# Patient Record
Sex: MALE | Race: WHITE | NOT HISPANIC OR LATINO | Employment: OTHER | URBAN - METROPOLITAN AREA
[De-identification: names, ages, dates, MRNs, and addresses within clinical notes are randomized per-mention and may not be internally consistent; named-entity substitution may affect disease eponyms.]

---

## 2019-11-14 ENCOUNTER — OFFICE VISIT (OUTPATIENT)
Dept: FAMILY MEDICINE CLINIC | Facility: CLINIC | Age: 64
End: 2019-11-14

## 2019-11-14 VITALS
HEART RATE: 87 BPM | WEIGHT: 215 LBS | OXYGEN SATURATION: 97 % | HEIGHT: 70 IN | BODY MASS INDEX: 30.78 KG/M2 | SYSTOLIC BLOOD PRESSURE: 138 MMHG | DIASTOLIC BLOOD PRESSURE: 80 MMHG

## 2019-11-14 DIAGNOSIS — R10.11 RUQ PAIN: ICD-10-CM

## 2019-11-14 DIAGNOSIS — J84.9 INTERSTITIAL PNEUMONIA (HCC): ICD-10-CM

## 2019-11-14 DIAGNOSIS — R05.9 COUGH: Primary | ICD-10-CM

## 2019-11-14 PROCEDURE — 99203 OFFICE O/P NEW LOW 30 MIN: CPT | Performed by: FAMILY MEDICINE

## 2019-11-14 PROCEDURE — 96372 THER/PROPH/DIAG INJ SC/IM: CPT | Performed by: FAMILY MEDICINE

## 2019-11-14 RX ORDER — ONDANSETRON 4 MG/1
TABLET, ORALLY DISINTEGRATING ORAL
Refills: 0 | COMMUNITY
Start: 2019-11-01 | End: 2020-02-24

## 2019-11-14 RX ORDER — OMEPRAZOLE 20 MG/1
20 CAPSULE, DELAYED RELEASE ORAL DAILY
COMMUNITY

## 2019-11-14 RX ORDER — ALBUTEROL SULFATE 90 UG/1
2 AEROSOL, METERED RESPIRATORY (INHALATION) EVERY 4 HOURS PRN
COMMUNITY
Start: 2019-11-13

## 2019-11-14 RX ORDER — METHYLPREDNISOLONE ACETATE 80 MG/ML
80 INJECTION, SUSPENSION INTRA-ARTICULAR; INTRALESIONAL; INTRAMUSCULAR; SOFT TISSUE ONCE
Status: COMPLETED | OUTPATIENT
Start: 2019-11-14 | End: 2019-11-14

## 2019-11-14 RX ADMIN — METHYLPREDNISOLONE ACETATE 80 MG: 80 INJECTION, SUSPENSION INTRA-ARTICULAR; INTRALESIONAL; INTRAMUSCULAR; SOFT TISSUE at 15:02

## 2019-11-14 NOTE — PROGRESS NOTES
Subjective   Kb Walker is a 64 y.o. male.     Chief Complaint   Patient presents with   • Establish Care     complications due to pneumonia        History of Present Illness     Acute complaints:  Kb Walker is a 64 y.o. male who presents today to establish care. Started October 11, he had been combining sawdust and lots of irritants, developed pneumonia. Initially on Levaquin, no better, then doxycycline for 10 days, no better, then recvd a shot of rocephin, albuterol. A repeat CT scan the next week showed resolution of the PNA but still having symptoms, along with GI symptoms. Rx prednisone which he did not take much due to GI side effects. Now still having breathing difficulty and GI effects despite taking probiotics.    This patient is accompanied by their wife Lety who contributes to the history of their care.    The following portions of the patient's history were reviewed and updated as appropriate: allergies, current medications, past family history, past medical history, past social history, past surgical history and problem list.    Active Ambulatory Problems     Diagnosis Date Noted   • No Active Ambulatory Problems     Resolved Ambulatory Problems     Diagnosis Date Noted   • No Resolved Ambulatory Problems     Past Medical History:   Diagnosis Date   • Arthritis    • Gall stones      History reviewed. No pertinent surgical history.  Family History   Problem Relation Age of Onset   • Arthritis Mother    • Arthritis Father    • Hypertension Father      Social History     Socioeconomic History   • Marital status:      Spouse name: Not on file   • Number of children: Not on file   • Years of education: Not on file   • Highest education level: Not on file   Tobacco Use   • Smoking status: Current Every Day Smoker     Packs/day: 1.00     Types: Cigarettes   • Smokeless tobacco: Never Used   Substance and Sexual Activity   • Alcohol use: No     Frequency: Never   • Drug use: No  "        Review of Systems   Constitutional: Negative.    HENT: Negative.  Negative for hearing loss, rhinorrhea, sneezing, tinnitus and trouble swallowing.    Eyes: Negative.    Respiratory: Positive for cough and shortness of breath. Negative for wheezing.    Cardiovascular: Negative.  Negative for chest pain and palpitations.   Gastrointestinal: Positive for abdominal pain, constipation and diarrhea.   Endocrine: Negative for cold intolerance and heat intolerance.   Genitourinary: Negative for difficulty urinating, dysuria and frequency.   Musculoskeletal: Negative for joint swelling and neck stiffness.   Skin: Negative for color change and wound.   Neurological: Negative for dizziness, tremors and seizures.   Psychiatric/Behavioral: Negative for agitation and confusion.       Objective   Blood pressure 138/80, pulse 87, height 177.8 cm (70\"), weight 97.5 kg (215 lb), SpO2 97 %.  Nursing note reviewed  Physical Exam  Const: NAD, A&Ox4, Pleasant, Cooperative  Eyes: EOMI, no conjunctivitis  ENT: No nasal discharge present, neck supple  Cardiac: Regular rate and rhythm, no cyanosis  Resp: Respiratory rate within normal limits, no increased work of breathing, no audible wheezing or retractions noted  GI: No distention or ascites  MSK: Motor and sensation grossly intact in bilateral upper extremities  Neurologic: CN II-XII grossly intact  Psych: Appropriate mood and behavior.  Skin: Pink, warm, dry  Procedures  Assessment/Plan   Problem List Items Addressed This Visit     None      Visit Diagnoses     Cough    -  Primary    Relevant Medications    methylPREDNISolone acetate (DEPO-medrol) injection 80 mg (Completed)    RUQ pain        Interstitial pneumonia (CMS/HCC)        Relevant Medications    albuterol sulfate  (90 Base) MCG/ACT inhaler    diphenhydrAMINE (BENADRYL) 50 MG tablet    methylPREDNISolone acetate (DEPO-medrol) injection 80 mg (Completed)        #1 pneumonia  Likely interstitial/reactive secondary " to the dust, I would like him to make sure he wears a ventilated mask in the future.  Recommended a Depo-Medrol injection today, sent home with a sample of Asmanex inhaler 2 puffs daily.  I will see him back in about 10 days to see how he is doing.    #2 right upper quadrant pain  He had an incidentally found gallstones on his CT, his symptoms are consistent with gallstone disease, discussed dietary avoidance strategies, his issue is certainly mild right now  Once his pneumonia is improved if his symptoms persist would recommend right upper quadrant ultrasound    We will plan to obtain previous records both for chronic preventative care as well as those related to the current episode of care.  Any records that the patient brought with him today were reviewed personally by me during the visit today and will be scanned into the chart for posterity.    There are no Patient Instructions on file for this visit.    Return in about 10 days (around 11/24/2019).    Ambulatory progress note signed and attested to by Lonny Pugh D.O.

## 2019-11-18 ENCOUNTER — HOSPITAL ENCOUNTER (INPATIENT)
Facility: HOSPITAL | Age: 64
LOS: 2 days | Discharge: HOME | End: 2019-11-20
Attending: EMERGENCY MEDICINE | Admitting: INTERNAL MEDICINE

## 2019-11-18 ENCOUNTER — APPOINTMENT (OUTPATIENT)
Dept: GENERAL RADIOLOGY | Facility: HOSPITAL | Age: 64
End: 2019-11-18

## 2019-11-18 ENCOUNTER — OFFICE VISIT (OUTPATIENT)
Dept: FAMILY MEDICINE CLINIC | Facility: CLINIC | Age: 64
End: 2019-11-18

## 2019-11-18 VITALS
HEART RATE: 70 BPM | OXYGEN SATURATION: 79 % | SYSTOLIC BLOOD PRESSURE: 124 MMHG | HEIGHT: 70 IN | WEIGHT: 213 LBS | BODY MASS INDEX: 30.49 KG/M2 | DIASTOLIC BLOOD PRESSURE: 84 MMHG

## 2019-11-18 DIAGNOSIS — R09.02 HYPOXIA: ICD-10-CM

## 2019-11-18 DIAGNOSIS — I50.9 ACUTE ON CHRONIC CONGESTIVE HEART FAILURE, UNSPECIFIED HEART FAILURE TYPE (HCC): Primary | ICD-10-CM

## 2019-11-18 DIAGNOSIS — J90 PLEURAL EFFUSION: ICD-10-CM

## 2019-11-18 DIAGNOSIS — J84.9 INTERSTITIAL PNEUMONIA (HCC): ICD-10-CM

## 2019-11-18 DIAGNOSIS — R05.9 COUGH: Primary | ICD-10-CM

## 2019-11-18 PROBLEM — Z72.0 TOBACCO USE: Status: ACTIVE | Noted: 2019-11-18

## 2019-11-18 PROBLEM — J44.9 COPD (CHRONIC OBSTRUCTIVE PULMONARY DISEASE) (HCC): Status: ACTIVE | Noted: 2019-11-18

## 2019-11-18 PROBLEM — R73.9 HYPERGLYCEMIA: Status: ACTIVE | Noted: 2019-11-18

## 2019-11-18 PROBLEM — J96.01 ACUTE RESPIRATORY FAILURE WITH HYPOXIA (HCC): Status: ACTIVE | Noted: 2019-11-18

## 2019-11-18 LAB
ALBUMIN SERPL-MCNC: 4.4 G/DL (ref 3.5–5.2)
ALBUMIN/GLOB SERPL: 1.4 G/DL
ALP SERPL-CCNC: 68 U/L (ref 39–117)
ALT SERPL W P-5'-P-CCNC: 33 U/L (ref 1–41)
ANION GAP SERPL CALCULATED.3IONS-SCNC: 13 MMOL/L (ref 5–15)
AST SERPL-CCNC: 39 U/L (ref 1–40)
BASOPHILS # BLD AUTO: 0.06 10*3/MM3 (ref 0–0.2)
BASOPHILS NFR BLD AUTO: 0.7 % (ref 0–1.5)
BILIRUB SERPL-MCNC: 0.9 MG/DL (ref 0.2–1.2)
BUN BLD-MCNC: 18 MG/DL (ref 8–23)
BUN/CREAT SERPL: 17.5 (ref 7–25)
CALCIUM SPEC-SCNC: 9.4 MG/DL (ref 8.6–10.5)
CHLORIDE SERPL-SCNC: 95 MMOL/L (ref 98–107)
CO2 SERPL-SCNC: 27 MMOL/L (ref 22–29)
CREAT BLD-MCNC: 1.03 MG/DL (ref 0.76–1.27)
DEPRECATED RDW RBC AUTO: 55.6 FL (ref 37–54)
EOSINOPHIL # BLD AUTO: 0.08 10*3/MM3 (ref 0–0.4)
EOSINOPHIL NFR BLD AUTO: 0.9 % (ref 0.3–6.2)
ERYTHROCYTE [DISTWIDTH] IN BLOOD BY AUTOMATED COUNT: 14.5 % (ref 12.3–15.4)
GFR SERPL CREATININE-BSD FRML MDRD: 72 ML/MIN/1.73
GLOBULIN UR ELPH-MCNC: 3.2 GM/DL
GLUCOSE BLD-MCNC: 144 MG/DL (ref 65–99)
HCT VFR BLD AUTO: 50.5 % (ref 37.5–51)
HGB BLD-MCNC: 15.9 G/DL (ref 13–17.7)
HOLD SPECIMEN: NORMAL
HOLD SPECIMEN: NORMAL
IMM GRANULOCYTES # BLD AUTO: 0.06 10*3/MM3 (ref 0–0.05)
IMM GRANULOCYTES NFR BLD AUTO: 0.7 % (ref 0–0.5)
LYMPHOCYTES # BLD AUTO: 1.7 10*3/MM3 (ref 0.7–3.1)
LYMPHOCYTES NFR BLD AUTO: 19.1 % (ref 19.6–45.3)
MCH RBC QN AUTO: 32.9 PG (ref 26.6–33)
MCHC RBC AUTO-ENTMCNC: 31.5 G/DL (ref 31.5–35.7)
MCV RBC AUTO: 104.3 FL (ref 79–97)
MONOCYTES # BLD AUTO: 1.01 10*3/MM3 (ref 0.1–0.9)
MONOCYTES NFR BLD AUTO: 11.3 % (ref 5–12)
NEUTROPHILS # BLD AUTO: 5.99 10*3/MM3 (ref 1.7–7)
NEUTROPHILS NFR BLD AUTO: 67.3 % (ref 42.7–76)
NRBC BLD AUTO-RTO: 0 /100 WBC (ref 0–0.2)
NT-PROBNP SERPL-MCNC: 4882 PG/ML (ref 5–900)
PLATELET # BLD AUTO: 197 10*3/MM3 (ref 140–450)
PMV BLD AUTO: 10.4 FL (ref 6–12)
POTASSIUM BLD-SCNC: 5 MMOL/L (ref 3.5–5.2)
PROT SERPL-MCNC: 7.6 G/DL (ref 6–8.5)
RBC # BLD AUTO: 4.84 10*6/MM3 (ref 4.14–5.8)
SODIUM BLD-SCNC: 135 MMOL/L (ref 136–145)
TROPONIN T SERPL-MCNC: <0.01 NG/ML (ref 0–0.03)
WBC NRBC COR # BLD: 8.9 10*3/MM3 (ref 3.4–10.8)
WHOLE BLOOD HOLD SPECIMEN: NORMAL
WHOLE BLOOD HOLD SPECIMEN: NORMAL

## 2019-11-18 PROCEDURE — 93005 ELECTROCARDIOGRAM TRACING: CPT | Performed by: EMERGENCY MEDICINE

## 2019-11-18 PROCEDURE — 83880 ASSAY OF NATRIURETIC PEPTIDE: CPT | Performed by: EMERGENCY MEDICINE

## 2019-11-18 PROCEDURE — 99285 EMERGENCY DEPT VISIT HI MDM: CPT

## 2019-11-18 PROCEDURE — 80053 COMPREHEN METABOLIC PANEL: CPT | Performed by: EMERGENCY MEDICINE

## 2019-11-18 PROCEDURE — 25010000002 FUROSEMIDE PER 20 MG: Performed by: EMERGENCY MEDICINE

## 2019-11-18 PROCEDURE — 94640 AIRWAY INHALATION TREATMENT: CPT

## 2019-11-18 PROCEDURE — 83735 ASSAY OF MAGNESIUM: CPT | Performed by: PHYSICIAN ASSISTANT

## 2019-11-18 PROCEDURE — 94799 UNLISTED PULMONARY SVC/PX: CPT

## 2019-11-18 PROCEDURE — 71045 X-RAY EXAM CHEST 1 VIEW: CPT

## 2019-11-18 PROCEDURE — 84484 ASSAY OF TROPONIN QUANT: CPT | Performed by: EMERGENCY MEDICINE

## 2019-11-18 PROCEDURE — 85025 COMPLETE CBC W/AUTO DIFF WBC: CPT | Performed by: EMERGENCY MEDICINE

## 2019-11-18 PROCEDURE — 99222 1ST HOSP IP/OBS MODERATE 55: CPT | Performed by: FAMILY MEDICINE

## 2019-11-18 PROCEDURE — 99214 OFFICE O/P EST MOD 30 MIN: CPT | Performed by: FAMILY MEDICINE

## 2019-11-18 RX ORDER — ONDANSETRON 4 MG/1
4 TABLET, FILM COATED ORAL EVERY 6 HOURS PRN
Status: DISCONTINUED | OUTPATIENT
Start: 2019-11-18 | End: 2019-11-20 | Stop reason: HOSPADM

## 2019-11-18 RX ORDER — ONDANSETRON 2 MG/ML
4 INJECTION INTRAMUSCULAR; INTRAVENOUS EVERY 6 HOURS PRN
Status: DISCONTINUED | OUTPATIENT
Start: 2019-11-18 | End: 2019-11-20 | Stop reason: HOSPADM

## 2019-11-18 RX ORDER — PANTOPRAZOLE SODIUM 40 MG/1
40 TABLET, DELAYED RELEASE ORAL EVERY MORNING
Status: DISCONTINUED | OUTPATIENT
Start: 2019-11-19 | End: 2019-11-20 | Stop reason: HOSPADM

## 2019-11-18 RX ORDER — SODIUM CHLORIDE 0.9 % (FLUSH) 0.9 %
10 SYRINGE (ML) INJECTION EVERY 12 HOURS SCHEDULED
Status: DISCONTINUED | OUTPATIENT
Start: 2019-11-18 | End: 2019-11-20 | Stop reason: HOSPADM

## 2019-11-18 RX ORDER — NICOTINE 21 MG/24HR
1 PATCH, TRANSDERMAL 24 HOURS TRANSDERMAL ONCE
Status: COMPLETED | OUTPATIENT
Start: 2019-11-18 | End: 2019-11-19

## 2019-11-18 RX ORDER — ACETAMINOPHEN 325 MG/1
650 TABLET ORAL EVERY 6 HOURS PRN
Status: DISCONTINUED | OUTPATIENT
Start: 2019-11-18 | End: 2019-11-20 | Stop reason: HOSPADM

## 2019-11-18 RX ORDER — NICOTINE 21 MG/24HR
1 PATCH, TRANSDERMAL 24 HOURS TRANSDERMAL
Status: DISCONTINUED | OUTPATIENT
Start: 2019-11-19 | End: 2019-11-20 | Stop reason: HOSPADM

## 2019-11-18 RX ORDER — SODIUM CHLORIDE 0.9 % (FLUSH) 0.9 %
10 SYRINGE (ML) INJECTION AS NEEDED
Status: DISCONTINUED | OUTPATIENT
Start: 2019-11-18 | End: 2019-11-20 | Stop reason: HOSPADM

## 2019-11-18 RX ORDER — IPRATROPIUM BROMIDE AND ALBUTEROL SULFATE 2.5; .5 MG/3ML; MG/3ML
3 SOLUTION RESPIRATORY (INHALATION) EVERY 4 HOURS PRN
Status: DISCONTINUED | OUTPATIENT
Start: 2019-11-18 | End: 2019-11-20 | Stop reason: HOSPADM

## 2019-11-18 RX ORDER — L.ACID,PARA/B.BIFIDUM/S.THERM 8B CELL
1 CAPSULE ORAL DAILY
Status: DISCONTINUED | OUTPATIENT
Start: 2019-11-19 | End: 2019-11-20 | Stop reason: HOSPADM

## 2019-11-18 RX ORDER — IBUPROFEN 600 MG/1
600 TABLET ORAL EVERY 8 HOURS PRN
COMMUNITY
End: 2019-11-20 | Stop reason: HOSPADM

## 2019-11-18 RX ORDER — ACETAMINOPHEN 325 MG/1
650 TABLET ORAL EVERY 6 HOURS PRN
COMMUNITY

## 2019-11-18 RX ORDER — FUROSEMIDE 10 MG/ML
40 INJECTION INTRAMUSCULAR; INTRAVENOUS ONCE
Status: COMPLETED | OUTPATIENT
Start: 2019-11-18 | End: 2019-11-18

## 2019-11-18 RX ORDER — TRAZODONE HYDROCHLORIDE 100 MG/1
100 TABLET ORAL NIGHTLY PRN
COMMUNITY
End: 2020-02-24

## 2019-11-18 RX ORDER — BUDESONIDE AND FORMOTEROL FUMARATE DIHYDRATE 160; 4.5 UG/1; UG/1
2 AEROSOL RESPIRATORY (INHALATION)
COMMUNITY
End: 2020-02-24

## 2019-11-18 RX ORDER — BUDESONIDE AND FORMOTEROL FUMARATE DIHYDRATE 160; 4.5 UG/1; UG/1
2 AEROSOL RESPIRATORY (INHALATION)
Status: DISCONTINUED | OUTPATIENT
Start: 2019-11-18 | End: 2019-11-20 | Stop reason: HOSPADM

## 2019-11-18 RX ADMIN — NICOTINE 1 PATCH: 21 PATCH, EXTENDED RELEASE TRANSDERMAL at 22:46

## 2019-11-18 RX ADMIN — SODIUM CHLORIDE, PRESERVATIVE FREE 10 ML: 5 INJECTION INTRAVENOUS at 22:46

## 2019-11-18 RX ADMIN — ACETAMINOPHEN 650 MG: 325 TABLET ORAL at 22:54

## 2019-11-18 RX ADMIN — BUDESONIDE AND FORMOTEROL FUMARATE DIHYDRATE 2 PUFF: 160; 4.5 AEROSOL RESPIRATORY (INHALATION) at 21:09

## 2019-11-18 RX ADMIN — FUROSEMIDE 40 MG: 10 INJECTION, SOLUTION INTRAMUSCULAR; INTRAVENOUS at 17:56

## 2019-11-18 NOTE — ED PROVIDER NOTES
Subjective   Mr. Kb Walker is a 64 y.o male presenting to the emergency department with complaints of shortness of breath. He went to a clinic 10/10/19 and had an x-ray that showed both lungs were affected. He was given antibiotics that provided temporary relief but his symptoms worsened again. On 10/15/19, he had another x-ray and found that 1 lung had improved but the other had worsened. He was given more steroids and antibiotics. His symptoms continued to worsen and he went back for another CT scan and was told his pneumonia was gone. When he went to his primary care physician today at approximately 1530, they found his oxygen to be 78. He is not currently on oxygen at home. He complains of nausea, chills, productive cough, and intermittent constipation, left upper quadrant abdominal pain, and diarrhea. He states his abdominal pain is slightly relieved when he passes gas. He denies fever. He is a smoker and denies having COPD and emphysema. There are no other acute symptoms at this time.         History provided by:  Patient  Shortness of Breath   Severity:  Moderate  Onset quality:  Sudden  Duration:  1 month  Timing:  Constant  Progression:  Worsening  Chronicity:  New  Relieved by: medication temporarily.  Worsened by:  Nothing  Ineffective treatments:  Inhaler  Associated symptoms: abdominal pain and cough (productive)    Associated symptoms: no fever        Review of Systems   Constitutional: Positive for chills. Negative for fever.   Respiratory: Positive for cough (productive) and shortness of breath.    Gastrointestinal: Positive for abdominal pain, constipation, diarrhea and nausea.   All other systems reviewed and are negative.      Past Medical History:   Diagnosis Date   • Arthritis    • Gall stones        No Known Allergies    History reviewed. No pertinent surgical history.    Family History   Problem Relation Age of Onset   • Arthritis Mother    • Arthritis Father    • Hypertension Father         Social History     Socioeconomic History   • Marital status:      Spouse name: Not on file   • Number of children: Not on file   • Years of education: Not on file   • Highest education level: Not on file   Tobacco Use   • Smoking status: Current Every Day Smoker     Packs/day: 1.00     Years: 50.00     Pack years: 50.00     Types: Cigarettes   • Smokeless tobacco: Never Used   Substance and Sexual Activity   • Alcohol use: Yes     Frequency: Never     Comment: occasional   • Drug use: No         Objective   Physical Exam   Constitutional: He is oriented to person, place, and time. He appears well-developed and well-nourished. No distress.   HENT:   Head: Normocephalic and atraumatic.   Eyes: Conjunctivae and EOM are normal. No scleral icterus.   Neck: Normal range of motion. Neck supple.   Cardiovascular: Normal rate, regular rhythm and normal heart sounds.   No murmur heard.  Pulmonary/Chest:   Inspiratory crackles in the left mid lung, left base, and right base   Abdominal: Soft. There is no tenderness.   Musculoskeletal: Normal range of motion. He exhibits edema. He exhibits no tenderness.   He has at least 2 plus edema extending to the knees in bilateral lower extremitites. Equal calf size.   Neurological: He is alert and oriented to person, place, and time.   Normal mentation   Skin: Skin is warm and dry. He is not diaphoretic.   Psychiatric: He has a normal mood and affect. His behavior is normal.   Nursing note and vitals reviewed.      Procedures         ED Course     Recent Results (from the past 24 hour(s))   Comprehensive Metabolic Panel    Collection Time: 11/18/19  4:32 PM   Result Value Ref Range    Glucose 144 (H) 65 - 99 mg/dL    BUN 18 8 - 23 mg/dL    Creatinine 1.03 0.76 - 1.27 mg/dL    Sodium 135 (L) 136 - 145 mmol/L    Potassium 5.0 3.5 - 5.2 mmol/L    Chloride 95 (L) 98 - 107 mmol/L    CO2 27.0 22.0 - 29.0 mmol/L    Calcium 9.4 8.6 - 10.5 mg/dL    Total Protein 7.6 6.0 - 8.5 g/dL     Albumin 4.40 3.50 - 5.20 g/dL    ALT (SGPT) 33 1 - 41 U/L    AST (SGOT) 39 1 - 40 U/L    Alkaline Phosphatase 68 39 - 117 U/L    Total Bilirubin 0.9 0.2 - 1.2 mg/dL    eGFR Non African Amer 72 >60 mL/min/1.73    Globulin 3.2 gm/dL    A/G Ratio 1.4 g/dL    BUN/Creatinine Ratio 17.5 7.0 - 25.0    Anion Gap 13.0 5.0 - 15.0 mmol/L   BNP    Collection Time: 11/18/19  4:32 PM   Result Value Ref Range    proBNP 4,882.0 (H) 5.0 - 900.0 pg/mL   Troponin    Collection Time: 11/18/19  4:32 PM   Result Value Ref Range    Troponin T <0.010 0.000 - 0.030 ng/mL   Light Blue Top    Collection Time: 11/18/19  4:32 PM   Result Value Ref Range    Extra Tube hold for add-on    Green Top (Gel)    Collection Time: 11/18/19  4:32 PM   Result Value Ref Range    Extra Tube Hold for add-ons.    Lavender Top    Collection Time: 11/18/19  4:32 PM   Result Value Ref Range    Extra Tube hold for add-on    Gold Top - SST    Collection Time: 11/18/19  4:32 PM   Result Value Ref Range    Extra Tube Hold for add-ons.    CBC Auto Differential    Collection Time: 11/18/19  4:32 PM   Result Value Ref Range    WBC 8.90 3.40 - 10.80 10*3/mm3    RBC 4.84 4.14 - 5.80 10*6/mm3    Hemoglobin 15.9 13.0 - 17.7 g/dL    Hematocrit 50.5 37.5 - 51.0 %    .3 (H) 79.0 - 97.0 fL    MCH 32.9 26.6 - 33.0 pg    MCHC 31.5 31.5 - 35.7 g/dL    RDW 14.5 12.3 - 15.4 %    RDW-SD 55.6 (H) 37.0 - 54.0 fl    MPV 10.4 6.0 - 12.0 fL    Platelets 197 140 - 450 10*3/mm3    Neutrophil % 67.3 42.7 - 76.0 %    Lymphocyte % 19.1 (L) 19.6 - 45.3 %    Monocyte % 11.3 5.0 - 12.0 %    Eosinophil % 0.9 0.3 - 6.2 %    Basophil % 0.7 0.0 - 1.5 %    Immature Grans % 0.7 (H) 0.0 - 0.5 %    Neutrophils, Absolute 5.99 1.70 - 7.00 10*3/mm3    Lymphocytes, Absolute 1.70 0.70 - 3.10 10*3/mm3    Monocytes, Absolute 1.01 (H) 0.10 - 0.90 10*3/mm3    Eosinophils, Absolute 0.08 0.00 - 0.40 10*3/mm3    Basophils, Absolute 0.06 0.00 - 0.20 10*3/mm3    Immature Grans, Absolute 0.06 (H) 0.00 - 0.05  10*3/mm3    nRBC 0.0 0.0 - 0.2 /100 WBC     Note: In addition to lab results from this visit, the labs listed above may include labs taken at another facility or during a different encounter within the last 24 hours. Please correlate lab times with ED admission and discharge times for further clarification of the services performed during this visit.    XR Chest 1 View   Preliminary Result   Heart is enlarged with ill-defined opacification at the   right lung base and increased pulmonary vascularity bilaterally.                Vitals:    11/18/19 1720 11/18/19 1721 11/18/19 1756 11/18/19 1800   BP: 132/92  120/98 128/94   BP Location:       Patient Position:       Pulse:  105 103 105   Resp:       Temp:       SpO2:  92%  93%   Weight:       Height:         Medications   sodium chloride 0.9 % flush 10 mL (not administered)   furosemide (LASIX) injection 40 mg (40 mg Intravenous Given 11/18/19 1756)     ECG/EMG Results (last 24 hours)     Procedure Component Value Units Date/Time    ECG 12 Lead [[id number]] Collected:  11/18/19 1626     Updated:  11/18/19 1839        ECG 12 Lead                         MDM  Number of Diagnoses or Management Options  Acute on chronic congestive heart failure, unspecified heart failure type (CMS/HCC): new and requires workup  Hypoxia: new and requires workup  Diagnosis management comments: Resents with complaint of increased difficulty breathing.  He reports the symptoms have been progressively worsening for approximate the last month.  He has completed steroids and 2 rounds of antibiotics during this time.    He was identified to be hypoxic to 78% on room at a clinic prior to being referred here to the ER.  The patient does not have any history of oxygen dependence.    The patient's lungs have crackles in bilateral bases, he has an elevated BNP, and a chest x-ray consistent with CHF exacerbation.  IV Lasix will be initiated.    I discussed the patient with the hospitalist, Dr. Valadez, who  will admit.       Amount and/or Complexity of Data Reviewed  Clinical lab tests: ordered and reviewed  Tests in the radiology section of CPT®: ordered and reviewed  Obtain history from someone other than the patient: yes  Review and summarize past medical records: yes  Independent visualization of images, tracings, or specimens: yes        Final diagnoses:   Acute on chronic congestive heart failure, unspecified heart failure type (CMS/HCC)   Hypoxia       Documentation assistance provided by roula Kim.  Information recorded by the scribe was done at my direction and has been verified and validated by me.     Josseline Kim  11/18/19 1914       Loyd Beal MD  11/18/19 1921

## 2019-11-18 NOTE — PROGRESS NOTES
Subjective   Kb Walker is a 64 y.o. male.     Chief Complaint   Patient presents with   • Insomnia     not able to sleep for 3 days, medication not helping    • Shortness of Breath   • Cough       History of Present Illness     Kb Walker presents today for   Chief Complaint   Patient presents with   • Insomnia     not able to sleep for 3 days, medication not helping    • Shortness of Breath   • Cough       Started October 11, he had been combining sawdust and lots of irritants, developed pneumonia. Initially on Levaquin, no better, then doxycycline for 10 days, no better, then recvd a shot of rocephin, albuterol. A repeat CT scan the next week showed resolution of the PNA but still having symptoms, along with GI symptoms. Rx prednisone which he did not take much due to GI side effects. Now still having breathing difficulty and GI effects despite taking probiotics.  I saw him on 11/14 for same, he was given a Depo-Medrol injection.  He initially felt significantly better but had the persistent orthopnea and dyspnea on exertion, of the steroid did help him somewhat with the latter.  He states that over the weekend he has not been able to sleep, feels like the medication is not helping.  He has increased shortness of breath when lying down.  He does have a known pleural effusion found on previous imaging from 11/12/2019.    The following portions of the patient's history were reviewed and updated as appropriate: allergies, current medications, past family history, past medical history, past social history, past surgical history and problem list.    Active Ambulatory Problems     Diagnosis Date Noted   • No Active Ambulatory Problems     Resolved Ambulatory Problems     Diagnosis Date Noted   • No Resolved Ambulatory Problems     Past Medical History:   Diagnosis Date   • Arthritis    • Gall stones      History reviewed. No pertinent surgical history.  Family History   Problem Relation Age of Onset   •  "Arthritis Mother    • Arthritis Father    • Hypertension Father      Social History     Socioeconomic History   • Marital status:      Spouse name: Not on file   • Number of children: Not on file   • Years of education: Not on file   • Highest education level: Not on file   Tobacco Use   • Smoking status: Current Every Day Smoker     Packs/day: 1.00     Types: Cigarettes   • Smokeless tobacco: Never Used   Substance and Sexual Activity   • Alcohol use: No     Frequency: Never   • Drug use: No       Review of Systems  Review of Systems -   General ROS: positive for  - fever and malaise  negative for - hot flashes, night sweats or weight loss  ENT ROS: positive for - headaches, nasal congestion, nasal discharge, sinus pain and sore throat  negative for - epistaxis, oral lesions, tinnitus or visual changes  Respiratory ROS: positive for - cough and sputum changes, shortness of breath or wheezing  negative for - hemoptysis, pleuritic pain,  Cardiovascular ROS: no chest pain or dyspnea on exertion    Objective   Blood pressure 124/84, pulse 70, height 177.8 cm (70\"), weight 96.6 kg (213 lb), SpO2 (!) 79 %.  Nursing note reviewed  Physical Exam  Const: Mildly ill-appearing but in no acute distress, A&Ox4, Pleasant, Cooperative  Eyes: EOMI, no conjunctivitis  Cardiac: Regular rate and rhythm, no cyanosis  Resp: Breathing is moderately labored.  Oxygen saturation at 79% on room air, comes up to 85% on 1 L.  He is able to speak in complete sentences but does get slightly out of breath.  Diffuse crackles noted  GI: There is some abdominal distention noted today  Psych: Appropriate mood and behavior.  Skin: Warm, dry  Procedures  Assessment/Plan     Problem List Items Addressed This Visit     None      Visit Diagnoses     Cough    -  Primary    Interstitial pneumonia (CMS/HCC)        Pleural effusion            #1  Acute respiratory failure with hypoxia  Concern is that his pleural effusion has worsened significantly, " he is having subjective worsening of abdominal distention and does have some on exam today.  Oxygen saturations in the 70s on room air, only in the mid 80s on 1 L of oxygen via nasal cannula.  -After discussion with the patient and his wife, they agree that it would be best for him to attend to the ER for further evaluation.  I will call head to alert them that he will be coming, anticipate that he may need to stay overnight    They elected to proceed via personal vehicle rather than any emergency medical services    Ambulatory progress note signed and attested to by Lonny Pugh D.O.

## 2019-11-19 ENCOUNTER — APPOINTMENT (OUTPATIENT)
Dept: CARDIOLOGY | Facility: HOSPITAL | Age: 64
End: 2019-11-19

## 2019-11-19 LAB
ANION GAP SERPL CALCULATED.3IONS-SCNC: 14 MMOL/L (ref 5–15)
BH CV ECHO MEAS - AO MAX PG (FULL): 1 MMHG
BH CV ECHO MEAS - AO MAX PG: 2.3 MMHG
BH CV ECHO MEAS - AO ROOT AREA (BSA CORRECTED): 1.4
BH CV ECHO MEAS - AO ROOT AREA: 6.5 CM^2
BH CV ECHO MEAS - AO ROOT DIAM: 2.9 CM
BH CV ECHO MEAS - AO V2 MAX: 76 CM/SEC
BH CV ECHO MEAS - ASC AORTA: 3.6 CM
BH CV ECHO MEAS - AVA(V,A): 2.3 CM^2
BH CV ECHO MEAS - AVA(V,D): 2.3 CM^2
BH CV ECHO MEAS - BSA(HAYCOCK): 2.2 M^2
BH CV ECHO MEAS - BSA: 2.1 M^2
BH CV ECHO MEAS - BZI_BMI: 30.6 KILOGRAMS/M^2
BH CV ECHO MEAS - BZI_METRIC_HEIGHT: 175.3 CM
BH CV ECHO MEAS - BZI_METRIC_WEIGHT: 93.9 KG
BH CV ECHO MEAS - EDV(CUBED): 169.9 ML
BH CV ECHO MEAS - EDV(MOD-SP2): 99 ML
BH CV ECHO MEAS - EDV(MOD-SP4): 162 ML
BH CV ECHO MEAS - EDV(TEICH): 149.8 ML
BH CV ECHO MEAS - EF(CUBED): 49.4 %
BH CV ECHO MEAS - EF(MOD-BP): 39 %
BH CV ECHO MEAS - EF(MOD-SP2): 31.3 %
BH CV ECHO MEAS - EF(MOD-SP4): 45.7 %
BH CV ECHO MEAS - EF(TEICH): 41.1 %
BH CV ECHO MEAS - ESV(CUBED): 85.9 ML
BH CV ECHO MEAS - ESV(MOD-SP2): 68 ML
BH CV ECHO MEAS - ESV(MOD-SP4): 88 ML
BH CV ECHO MEAS - ESV(TEICH): 88.3 ML
BH CV ECHO MEAS - FS: 20.3 %
BH CV ECHO MEAS - IVS/LVPW: 1.1
BH CV ECHO MEAS - IVSD: 1.1 CM
BH CV ECHO MEAS - LA DIMENSION: 5.2 CM
BH CV ECHO MEAS - LA/AO: 1.8
BH CV ECHO MEAS - LAD MAJOR: 6.6 CM
BH CV ECHO MEAS - LAT PEAK E' VEL: 9.1 CM/SEC
BH CV ECHO MEAS - LATERAL E/E' RATIO: 13
BH CV ECHO MEAS - LV DIASTOLIC VOL/BSA (35-75): 77.3 ML/M^2
BH CV ECHO MEAS - LV MASS(C)D: 241.8 GRAMS
BH CV ECHO MEAS - LV MASS(C)DI: 115.4 GRAMS/M^2
BH CV ECHO MEAS - LV MAX PG: 1.3 MMHG
BH CV ECHO MEAS - LV MEAN PG: 0.91 MMHG
BH CV ECHO MEAS - LV SYSTOLIC VOL/BSA (12-30): 42 ML/M^2
BH CV ECHO MEAS - LV V1 MAX: 57.2 CM/SEC
BH CV ECHO MEAS - LV V1 MEAN: 40.2 CM/SEC
BH CV ECHO MEAS - LV V1 VTI: 8.4 CM
BH CV ECHO MEAS - LVIDD: 5.5 CM
BH CV ECHO MEAS - LVIDS: 4.4 CM
BH CV ECHO MEAS - LVLD AP2: 7.8 CM
BH CV ECHO MEAS - LVLD AP4: 8.3 CM
BH CV ECHO MEAS - LVLS AP2: 6.7 CM
BH CV ECHO MEAS - LVLS AP4: 7 CM
BH CV ECHO MEAS - LVOT AREA (M): 3.1 CM^2
BH CV ECHO MEAS - LVOT AREA: 3.1 CM^2
BH CV ECHO MEAS - LVOT DIAM: 2 CM
BH CV ECHO MEAS - LVPWD: 1 CM
BH CV ECHO MEAS - MED PEAK E' VEL: 6.5 CM/SEC
BH CV ECHO MEAS - MEDIAL E/E' RATIO: 18.2
BH CV ECHO MEAS - MR ALIAS VEL: 30.6 CM/SEC
BH CV ECHO MEAS - MR ERO: 0.12 CM^2
BH CV ECHO MEAS - MR FLOW RATE: 67.1 CM^3/SEC
BH CV ECHO MEAS - MR MAX PG: 121 MMHG
BH CV ECHO MEAS - MR MAX VEL: 548.4 CM/SEC
BH CV ECHO MEAS - MR MEAN PG: 72.8 MMHG
BH CV ECHO MEAS - MR MEAN VEL: 394.8 CM/SEC
BH CV ECHO MEAS - MR PISA RADIUS: 0.59 CM
BH CV ECHO MEAS - MR PISA: 2.2 CM^2
BH CV ECHO MEAS - MR VOLUME: 15.4 ML
BH CV ECHO MEAS - MR VTI: 126.2 CM
BH CV ECHO MEAS - MV A MAX VEL: 48.3 CM/SEC
BH CV ECHO MEAS - MV AREA (1 DIAM): 17.5 CM^2
BH CV ECHO MEAS - MV DEC SLOPE: 1012 CM/SEC^2
BH CV ECHO MEAS - MV DEC TIME: 0.12 SEC
BH CV ECHO MEAS - MV DIAM: 4.7 CM
BH CV ECHO MEAS - MV E MAX VEL: 120.6 CM/SEC
BH CV ECHO MEAS - MV E/A: 2.5
BH CV ECHO MEAS - MV FLOW AREA(1DIAM): 17.5 CM^2
BH CV ECHO MEAS - MV MAX PG: 8.6 MMHG
BH CV ECHO MEAS - MV MEAN PG: 4.2 MMHG
BH CV ECHO MEAS - MV P1/2T MAX VEL: 154.7 CM/SEC
BH CV ECHO MEAS - MV P1/2T: 44.8 MSEC
BH CV ECHO MEAS - MV V2 MAX: 146.6 CM/SEC
BH CV ECHO MEAS - MV V2 MEAN: 93.8 CM/SEC
BH CV ECHO MEAS - MV V2 VTI: 25.8 CM
BH CV ECHO MEAS - MVA P1/2T LCG: 1.4 CM^2
BH CV ECHO MEAS - MVA(P1/2T): 4.9 CM^2
BH CV ECHO MEAS - MVA(VTI): 1 CM^2
BH CV ECHO MEAS - RAP SYSTOLE: 15 MMHG
BH CV ECHO MEAS - RF(MV,LVOT)(1DIAM): 0.94
BH CV ECHO MEAS - RVSP: 40 MMHG
BH CV ECHO MEAS - SI(CUBED): 40.1 ML/M^2
BH CV ECHO MEAS - SI(LVOT): 12.5 ML/M^2
BH CV ECHO MEAS - SI(MOD-SP2): 14.8 ML/M^2
BH CV ECHO MEAS - SI(MOD-SP4): 35.3 ML/M^2
BH CV ECHO MEAS - SI(MV 1 DIAM): 214.7 ML/M^2
BH CV ECHO MEAS - SI(TEICH): 29.4 ML/M^2
BH CV ECHO MEAS - SV(CUBED): 84 ML
BH CV ECHO MEAS - SV(LVOT): 26.2 ML
BH CV ECHO MEAS - SV(MOD-SP2): 31 ML
BH CV ECHO MEAS - SV(MOD-SP4): 74 ML
BH CV ECHO MEAS - SV(MV 1 DIAM): 450.1 ML
BH CV ECHO MEAS - SV(TEICH): 61.5 ML
BH CV ECHO MEAS - TAPSE (>1.6): 1.5 CM2
BH CV ECHO MEAS - TR MAX PG: 25 MMHG
BH CV ECHO MEAS - TR MAX VEL: 248 CM/SEC
BH CV ECHO MEASUREMENTS AVERAGE E/E' RATIO: 15.46
BH CV VAS BP LEFT ARM: NORMAL MMHG
BH CV XLRA - RV BASE: 4.2 CM
BH CV XLRA - RV LENGTH: 7 CM
BH CV XLRA - RV MID: 2.6 CM
BH CV XLRA - TDI S': 11.2 CM/SEC
BUN BLD-MCNC: 16 MG/DL (ref 8–23)
BUN/CREAT SERPL: 17.4 (ref 7–25)
CALCIUM SPEC-SCNC: 8.9 MG/DL (ref 8.6–10.5)
CHLORIDE SERPL-SCNC: 96 MMOL/L (ref 98–107)
CHOLEST SERPL-MCNC: 148 MG/DL (ref 0–200)
CO2 SERPL-SCNC: 24 MMOL/L (ref 22–29)
CREAT BLD-MCNC: 0.92 MG/DL (ref 0.76–1.27)
DEPRECATED RDW RBC AUTO: 54.4 FL (ref 37–54)
ERYTHROCYTE [DISTWIDTH] IN BLOOD BY AUTOMATED COUNT: 14.3 % (ref 12.3–15.4)
GFR SERPL CREATININE-BSD FRML MDRD: 82 ML/MIN/1.73
GLUCOSE BLD-MCNC: 103 MG/DL (ref 65–99)
HBA1C MFR BLD: 5.5 % (ref 4.8–5.6)
HCT VFR BLD AUTO: 49.8 % (ref 37.5–51)
HDLC SERPL-MCNC: 36 MG/DL (ref 40–60)
HGB BLD-MCNC: 16 G/DL (ref 13–17.7)
LDLC SERPL CALC-MCNC: 89 MG/DL (ref 0–100)
LDLC/HDLC SERPL: 2.47 {RATIO}
LEFT ATRIUM VOLUME INDEX: 52.5 ML/M^2
LEFT ATRIUM VOLUME: 110 ML
MAGNESIUM SERPL-MCNC: 2.3 MG/DL (ref 1.6–2.4)
MAXIMAL PREDICTED HEART RATE: 154 BPM
MCH RBC QN AUTO: 33.3 PG (ref 26.6–33)
MCHC RBC AUTO-ENTMCNC: 32.1 G/DL (ref 31.5–35.7)
MCV RBC AUTO: 103.8 FL (ref 79–97)
MR PISA EROA: 0.13 CM2
MV REGURGITANT FRACTION: 4 %
MV REGURGITANT VOLUME: 16 CC
PISA ALIASING VEL: 30.6 M/S
PISA RADIUS: 0.6 CM
PLATELET # BLD AUTO: 176 10*3/MM3 (ref 140–450)
PMV BLD AUTO: 9.8 FL (ref 6–12)
POTASSIUM BLD-SCNC: 4.7 MMOL/L (ref 3.5–5.2)
RBC # BLD AUTO: 4.8 10*6/MM3 (ref 4.14–5.8)
SODIUM BLD-SCNC: 134 MMOL/L (ref 136–145)
STRESS TARGET HR: 131 BPM
TRIGL SERPL-MCNC: 115 MG/DL (ref 0–150)
TSH SERPL DL<=0.05 MIU/L-ACNC: 2.06 UIU/ML (ref 0.27–4.2)
VLDLC SERPL-MCNC: 23 MG/DL
WBC NRBC COR # BLD: 8.74 10*3/MM3 (ref 3.4–10.8)

## 2019-11-19 PROCEDURE — 99233 SBSQ HOSP IP/OBS HIGH 50: CPT | Performed by: INTERNAL MEDICINE

## 2019-11-19 PROCEDURE — 25010000002 FUROSEMIDE PER 20 MG: Performed by: NURSE PRACTITIONER

## 2019-11-19 PROCEDURE — 94799 UNLISTED PULMONARY SVC/PX: CPT

## 2019-11-19 PROCEDURE — 83036 HEMOGLOBIN GLYCOSYLATED A1C: CPT | Performed by: PHYSICIAN ASSISTANT

## 2019-11-19 PROCEDURE — 25010000002 ENOXAPARIN PER 10 MG: Performed by: PHYSICIAN ASSISTANT

## 2019-11-19 PROCEDURE — 93306 TTE W/DOPPLER COMPLETE: CPT | Performed by: INTERNAL MEDICINE

## 2019-11-19 PROCEDURE — 80048 BASIC METABOLIC PNL TOTAL CA: CPT | Performed by: PHYSICIAN ASSISTANT

## 2019-11-19 PROCEDURE — 93306 TTE W/DOPPLER COMPLETE: CPT

## 2019-11-19 PROCEDURE — 80061 LIPID PANEL: CPT | Performed by: PHYSICIAN ASSISTANT

## 2019-11-19 PROCEDURE — 84443 ASSAY THYROID STIM HORMONE: CPT | Performed by: PHYSICIAN ASSISTANT

## 2019-11-19 PROCEDURE — 99222 1ST HOSP IP/OBS MODERATE 55: CPT | Performed by: INTERNAL MEDICINE

## 2019-11-19 PROCEDURE — 85027 COMPLETE CBC AUTOMATED: CPT | Performed by: PHYSICIAN ASSISTANT

## 2019-11-19 PROCEDURE — 99406 BEHAV CHNG SMOKING 3-10 MIN: CPT | Performed by: INTERNAL MEDICINE

## 2019-11-19 RX ORDER — CARVEDILOL 6.25 MG/1
6.25 TABLET ORAL 2 TIMES DAILY WITH MEALS
Status: DISCONTINUED | OUTPATIENT
Start: 2019-11-19 | End: 2019-11-19

## 2019-11-19 RX ORDER — FUROSEMIDE 10 MG/ML
40 INJECTION INTRAMUSCULAR; INTRAVENOUS ONCE
Status: COMPLETED | OUTPATIENT
Start: 2019-11-19 | End: 2019-11-19

## 2019-11-19 RX ORDER — LISINOPRIL 2.5 MG/1
2.5 TABLET ORAL
Status: DISCONTINUED | OUTPATIENT
Start: 2019-11-19 | End: 2019-11-19

## 2019-11-19 RX ORDER — LISINOPRIL 10 MG/1
10 TABLET ORAL
Status: DISCONTINUED | OUTPATIENT
Start: 2019-11-19 | End: 2019-11-20 | Stop reason: HOSPADM

## 2019-11-19 RX ORDER — CHOLECALCIFEROL (VITAMIN D3) 125 MCG
5 CAPSULE ORAL NIGHTLY PRN
Status: DISCONTINUED | OUTPATIENT
Start: 2019-11-19 | End: 2019-11-20 | Stop reason: HOSPADM

## 2019-11-19 RX ORDER — CARVEDILOL 6.25 MG/1
6.25 TABLET ORAL 2 TIMES DAILY WITH MEALS
Status: DISCONTINUED | OUTPATIENT
Start: 2019-11-19 | End: 2019-11-20 | Stop reason: HOSPADM

## 2019-11-19 RX ADMIN — SODIUM CHLORIDE, PRESERVATIVE FREE 10 ML: 5 INJECTION INTRAVENOUS at 22:22

## 2019-11-19 RX ADMIN — LISINOPRIL 10 MG: 10 TABLET ORAL at 17:32

## 2019-11-19 RX ADMIN — ENOXAPARIN SODIUM 40 MG: 40 INJECTION SUBCUTANEOUS at 09:10

## 2019-11-19 RX ADMIN — SODIUM CHLORIDE, PRESERVATIVE FREE 10 ML: 5 INJECTION INTRAVENOUS at 09:11

## 2019-11-19 RX ADMIN — PANTOPRAZOLE SODIUM 40 MG: 40 TABLET, DELAYED RELEASE ORAL at 07:35

## 2019-11-19 RX ADMIN — BUDESONIDE AND FORMOTEROL FUMARATE DIHYDRATE 2 PUFF: 160; 4.5 AEROSOL RESPIRATORY (INHALATION) at 20:25

## 2019-11-19 RX ADMIN — ACETAMINOPHEN 650 MG: 325 TABLET ORAL at 12:50

## 2019-11-19 RX ADMIN — Medication 1 CAPSULE: at 09:10

## 2019-11-19 RX ADMIN — ACETAMINOPHEN 650 MG: 325 TABLET ORAL at 23:48

## 2019-11-19 RX ADMIN — NICOTINE 1 PATCH: 21 PATCH, EXTENDED RELEASE TRANSDERMAL at 09:11

## 2019-11-19 RX ADMIN — BUDESONIDE AND FORMOTEROL FUMARATE DIHYDRATE 2 PUFF: 160; 4.5 AEROSOL RESPIRATORY (INHALATION) at 08:52

## 2019-11-19 RX ADMIN — CARVEDILOL 6.25 MG: 6.25 TABLET, FILM COATED ORAL at 17:32

## 2019-11-19 RX ADMIN — MELATONIN TAB 5 MG 5 MG: 5 TAB at 23:48

## 2019-11-19 RX ADMIN — FUROSEMIDE 40 MG: 10 INJECTION, SOLUTION INTRAMUSCULAR; INTRAVENOUS at 17:33

## 2019-11-19 RX ADMIN — FUROSEMIDE 40 MG: 10 INJECTION, SOLUTION INTRAMUSCULAR; INTRAVENOUS at 22:22

## 2019-11-19 NOTE — PROGRESS NOTES
"    [facility] Medicine Services  PROGRESS NOTE    Patient Name: Kb Walker  : 1955  MRN: [id number]    Date of Admission: 2019  Primary Care Physician: Lonny Pugh DO    Subjective   Subjective     CC:  Dyspnea x weeks    HPI:  Better today!  Voided quite a bit after lasix.  Also his appetite is better than expected.     Review of Systems   Gen- No fevers, chills  CV- No chest pain, palpitations.  Hasn't noted leg swelling at home - \"I dont pay attention'  Resp- No cough.  dysp is better.    GI- No N/V/D, abd pain          Objective   Objective     Vital Signs:   Temp:  [97.9 °F (36.6 °C)-98.6 °F (37 °C)] 98.1 °F (36.7 °C)  Heart Rate:  [] 102  Resp:  [18-24] 18  BP: (117-137)/() 137/103        Physical Exam:  Gen:  WD/WN man in NAD sitting up on EOB, family present.   Neuro: alert and oriented, clear speech, follows commands, grossly nonfocal  HEENT:  NC/AT PERRL, OP benign  Neck:  Supple, no LAD,   Heart RRR no murmur, rub, or gallop  Lungs few crackles at bases, no wheeze, nonlabored on NC ox  Abd:  Soft, nontender, no rebound or guarding, pos BS  Extrem:  No c/c.  1+ pitting edema noted  Skin warm and dry    Results Reviewed:    Results from last 7 days   Lab Units 19  0724 19  1632   WBC 10*3/mm3 8.74 8.90   HEMOGLOBIN g/dL 16.0 15.9   HEMATOCRIT % 49.8 50.5   PLATELETS 10*3/mm3 176 197     Results from last 7 days   Lab Units 19  0724 19  1632   SODIUM mmol/L 134* 135*   POTASSIUM mmol/L 4.7 5.0   CHLORIDE mmol/L 96* 95*   CO2 mmol/L 24.0 27.0   BUN mg/dL 16 18   CREATININE mg/dL 0.92 1.03   GLUCOSE mg/dL 103* 144*   CALCIUM mg/dL 8.9 9.4   ALT (SGPT) U/L  --  33   AST (SGOT) U/L  --  39   TROPONIN T ng/mL  --  <0.010   PROBNP pg/mL  --  4,882.0*     Estimated Creatinine Clearance: 89.5 mL/min (by C-G formula based on SCr of 0.92 mg/dL).    Microbiology Results Abnormal     None          Imaging Results (Last 24 Hours)     " Procedure Component Value Units Date/Time    XR Chest 1 View [[id number]] Collected:  11/18/19 1722     Updated:  11/19/19 0813    Narrative:       EXAMINATION: XR CHEST 1 VW- 11/18/2019     INDICATION: SOA triage protocol      COMPARISON: NONE     FINDINGS: Portable chest reveals heart to be enlarged. Ill-defined  opacification seen within the right lung base with small right pleural  effusion. Increased pulmonary vascularity bilaterally. Degenerative  changes seen within the spine.       Impression:       Heart is enlarged with ill-defined opacification at the  right lung base and increased pulmonary vascularity bilaterally.     D:  11/18/2019  E:  11/19/2019                Results for orders placed during the hospital encounter of 11/18/19   Adult Transthoracic Echo Complete With Contrast if Necessary Per Protocol    Narrative · Left ventricular systolic function is moderately decreased. Estimated EF   appears to be in the range of 36 - 40%.  · There is left ventricular global hypokinesis noted.  · Left ventricular diastolic dysfunction (grade III) consistent with   reversible restrictive pattern.  · Right ventricular cavity is borderline dilated.  · Left atrial cavity size is severely dilated.  · Right atrial cavity size is moderately dilated.  · Moderate-to-severe mitral valve regurgitation is present  · Mild tricuspid valve regurgitation is present.  · Estimated right ventricular systolic pressure from tricuspid   regurgitation is mildly elevated (35-45 mmHg).          I have reviewed the medications:  Scheduled Meds:  budesonide-formoterol 2 puff Inhalation BID - RT   enoxaparin 40 mg Subcutaneous Q24H   lactobacillus acidophilus 1 capsule Oral Daily   nicotine 1 patch Transdermal Q24H   nicotine 1 patch Transdermal Once   pantoprazole 40 mg Oral QAM   pharmacy consult - MTM  Does not apply Daily   sodium chloride 10 mL Intravenous Q12H     Continuous Infusions:   PRN Meds:.•  acetaminophen  •   ipratropium-albuterol  •  melatonin  •  ondansetron **OR** ondansetron  •  sodium chloride  •  sodium chloride      Assessment/Plan   Assessment / Plan     Active Hospital Problems    Diagnosis  POA   • **Acute respiratory failure with hypoxia (CMS/HCC) [J96.01]  Yes   • Tobacco use [Z72.0]  Yes   • Hyperglycemia [R73.9]  Yes   • Acute CHF (CMS/HCC) [I50.9]  Yes      Resolved Hospital Problems   No resolved problems to display.        Brief Hospital Course to date:  Kb Walker is a 64 y.o. male with hx significant for smoking.  3-4 weeks of dyspnea, treated with abx for presumed pna, then had clear CT by report, now presented to [facility] w/ complaints of worsening SOB.  Sat 78% on presentation and CXR suggests CHF.      Acute respiratory failure, hypoxic  CHF exacerbation:  Systolic/diastolic  - EF 36-40 with severe MVR and LA dilation; global LV hypokinesis; diast dysfxn  - Got lasix with improvement  - cards consult pending  - despite tobacco, start BB.   - lowdose ACEI for now; consider Entresto  - consider ischemic workup.  A1C and LDL normal.      Likely (undiagnosed) COPD  -- Continue Symbicort  -- Duo-nebs as needed     --Dr. Mukherjee to consider outpatient convalescent spirometry  --Strong encouragement to quit smoking      Tobacco use: Smokes 1 PPD x 50 years.  Counseled.  Patch.     Disposition: I expect the patient to be discharged tbd.    CODE STATUS:   Code Status and Medical Interventions:   Ordered at: 11/18/19 2029     Level Of Support Discussed With:    Patient     Code Status:    CPR     Medical Interventions (Level of Support Prior to Arrest):    Full         Electronically signed by Deneen Li MD, 11/19/19, 1:23 PM.

## 2019-11-19 NOTE — NURSING NOTE
Pt. Referred for Phase II Cardiac Rehab. Staff will follow up with this patient after pending Echo results.

## 2019-11-19 NOTE — NURSING NOTE
Patient Name:  Kb Walker  :  1955  DOS:  19    Active Hospital Problems    Diagnosis   • **Acute respiratory failure with hypoxia (CMS/HCC)   • Tobacco use   • Hyperglycemia   • Acute CHF (CMS/LTAC, located within [facility])       Consult has been received.  Medical records have been reviewed.  Patient is a good candidate for the [facility].  Education provided.  Education time 10 minutes.  Patient to be scheduled follow up 1 week post discharge.    Echo Results: pending from yesterday    NYHA Class: III-IV    Heart Failure Quality Measures    ACE I, ARB, ARNI (if EF <40%)     N/A  Echo pending ,will adjust medications if EF is low    Evidence-based Beta Blocker (EF<40%)    (Bisoprolol, Carvedilol, Metoprolol Succinate)  N/A      Aldosterone Antagonist (EF <40%)  N/A      Heart Failure Education    Signs / symptoms, Daily weight monitoring, Role of [facility] and when to call and Other:  HF zones

## 2019-11-19 NOTE — PROGRESS NOTES
Clinical Nutrition     Nutrition Assessment  Reason for Visit:   Identified at risk by screening criteria    Patient Name: Kb Walker  YOB: 1955  MRN: [id number]  Date of Encounter: 11/19/19 2:45 PM  Admission date: 11/18/2019    Nutrition Assessment   Admission Diagnosis         Acute respiratory failure with hypoxia (CMS/HCC)    Tobacco use    Hyperglycemia    Acute CHF (CMS/HCC)    h/o recent PNA    PMH: He  has a past medical history of Arthritis and Gall stones.   PSxH: He  has no past surgical history on file.   + Tobacco    Reported/Observed/Food/Nutrition Related History:     Pt resting in bed, reports feeling much better today, appetite improved, states that since he took abx for PNA, he has had  poor appetite 2nd GI issues, N/V/D, this has now resolved    Anthropometrics     Height: 69in  Last filed wt: 207lb  Weight Method: Bed scale    BMI: 31  Obese Class I: 30-34.9kg/m2      Weight Change :       Weight change:8lb loss, ? some weight loss may be 2nd lasix    Time frame of weight loss: 1 week    Last 15 Recorded Weights   View Complete Flowsheet   Weight Weight (kg) Weight (lbs) Weight Method VISIT REPORT   11/19/2019 94.303 kg 207 lb 14.4 oz Bed scale -   11/18/2019 94.303 kg 207 lb 14.4 oz Bed scale Report   11/18/2019 99.338 kg 219 lb - Report   11/18/2019 96.616 kg 213 lb - Report   11/14/2019 97.523 kg 215 lb - Report         Labs reviewed     Results from last 7 days   Lab Units 11/19/19  0724 11/18/19  1632   SODIUM mmol/L 134* 135*   POTASSIUM mmol/L 4.7 5.0   CHLORIDE mmol/L 96* 95*   CO2 mmol/L 24.0 27.0   BUN mg/dL 16 18   CREATININE mg/dL 0.92 1.03   CALCIUM mg/dL 8.9 9.4   BILIRUBIN mg/dL  --  0.9   ALK PHOS U/L  --  68   ALT (SGPT) U/L  --  33   AST (SGOT) U/L  --  39   GLUCOSE mg/dL 103* 144*           Lab Results   Lab Value Date/Time    HGBA1C 5.50 11/19/2019 0724         Medications reviewed   Pertinent:lasix, probiotic, protonix         GI:  wnl    SKIN:  wnl    Current Nutrition Prescription     PO: Cardiac diet    Intake: 100% of 2 meals  Insufficient data         Nutrition Diagnosis     Problem No nutrition diagnosis at this time   Etiology    Signs/Symptoms      Nutrition Intervention     Interventions Goal    General: Nutrition support treatment  Establish PO    Nutrition Interventions   1.  Follow treatment progress, Advised available snacks, Interview for preferences, Menu provided    Monitor/ Evaluation    Per protocol, PO intake, Pertinent labs, Weight, Skin status, GI status, Symptoms      Li Rodriguez RD  Time Spent: 30min

## 2019-11-19 NOTE — PROGRESS NOTES
Discharge Planning Assessment  [facility]     Patient Name: Kb Walker  MRN: [id number]  Today's Date: 11/19/2019    Admit Date: 11/18/2019    Discharge Needs Assessment     Row Name 11/19/19 1009       Living Environment    Current Living Arrangements  home/apartment/condo    Primary Care Provided by  self    Provides Primary Care For  no one    Family Caregiver if Needed  spouse    Quality of Family Relationships  supportive    Able to Return to Prior Arrangements  yes       Resource/Environmental Concerns    Resource/Environmental Concerns  none       Transition Planning    Patient/Family Anticipates Transition to  home with family    Patient/Family Anticipated Services at Transition  none       Discharge Needs Assessment    Readmission Within the Last 30 Days  no previous admission in last 30 days    Concerns to be Addressed  discharge planning    Equipment Currently Used at Home  none    Anticipated Changes Related to Illness  none    Equipment Needed After Discharge  none        Discharge Plan     Row Name 11/19/19 1010       Plan    Plan  Social work met with Mr. Walker for a discharge planning assessment. Mr. Walker said that he lives with his spouse at home. He said that he lives in [place] and his insurance is [employer] and [facility] Medicare.  His goal is to go home from the hospital and he would like to be able to go home today after his tests.  Case Management will continue to follow Mr. Walker.    Patient/Family in Agreement with Plan  yes        Destination      No service coordination in this encounter.      Durable Medical Equipment      No service coordination in this encounter.      Dialysis/Infusion      No service coordination in this encounter.      Home Medical Care      No service coordination in this encounter.      Therapy      No service coordination in this encounter.      Community Resources      No service coordination in this encounter.          Demographic Summary     Row  Name 11/19/19 1007       General Information    Admission Type  inpatient    Arrived From  home    Referral Source  patient    Reason for Consult  discharge planning    Preferred Language  English       Contact Information    Permission Granted to Share Info With      Contact Information Obtained for          Functional Status     Row Name 11/19/19 1009       Functional Status    Usual Activity Tolerance  good    Current Activity Tolerance  good       Functional Status, IADL    Medications  independent    Meal Preparation  independent    Housekeeping  independent    Laundry  independent    Shopping  independent       Mental Status Summary    Recent Changes in Mental Status/Cognitive Functioning  no changes        Psychosocial    No documentation.       Abuse/Neglect    No documentation.       Legal    No documentation.       Substance Abuse    No documentation.       Patient Forms    No documentation.           KRISTAL Aguilar

## 2019-11-19 NOTE — CONSULTS
[facility] at [facility]   Cardiology Consultation Note    Kb NATION Denise  1955    There is no work phone number on file.      11/19/19    DATE OF ADMISSION: 11/18/2019  [facility]    [address],   [id number]  / [facility] [id number]    Chief Complaint/Reason for Consultation: Dyspnea, congestive heart failure           Patient Active Problem List   Diagnosis   • Acute respiratory failure with hypoxia (CMS/HCC)   • Tobacco use   • Hyperglycemia   • Acute CHF (CMS/HCC)       History of Present Illness:   Patient is a 64 year old male with a past medical history significant for tobacco abuse who presented to the [facility] ED yesterday with c/o progressively worsening SOB over the last month, symptoms occurring daily, with associated cough and fatigue.  He was recently treated by his PCP for bilateral pneumonia including 2 rounds of antibiotics as well as steroids but patient reports he did not notice any significant improvement in his symptoms.  He started to feel more short of breath with associated generalized weakness and presented back to his PCP yesterday for further evaluation.  He was noted to be saturating 78% on room air and was subsequently sent to the ED for further evaluation.  Upon admission, proBNP 4,882, CXR demonstrating cardiomegaly as well as increased pulmonary vascularity bilaterally.  Troponin negative.  Other lab work essentially unremarkable.  EKG showing sinus tachycardia at a rate of 111 bpm.  He was noted to have 2+ lower extremity swelling but patient reports he was not aware of this.  He did receive 40 mg IV lasix in the ED.  The patient reports he feels significantly improved today.  Cardiology has been asked to see the patient for further evaluation and management of acute congestive heart failure.  Echocardiogram is currently pending.  The patient denies any prior cardiac history but reports that he does not routinely follow with  a doctor.  He denies any history of stress testing or left heart catheterization.  He is a current 1 pack/day smoker for the last 50 years.  His father had congestive heart failure and his brother  of an MI in his 70s.  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    No Known Allergies    Prior to Admission Medications     Prescriptions Last Dose Informant Patient Reported? Taking?    acetaminophen (TYLENOL) 325 MG tablet 2019 Self Yes Yes    Take 650 mg by mouth Every 6 (Six) Hours As Needed for Mild Pain .    albuterol sulfate  (90 Base) MCG/ACT inhaler 2019 Pharmacy Yes Yes    Inhale 2 puffs Every 4 (Four) Hours As Needed.    budesonide-formoterol (SYMBICORT) 160-4.5 MCG/ACT inhaler 2019 Self Yes Yes    Inhale 2 puffs 2 (Two) Times a Day.    diphenhydrAMINE (BENADRYL) 50 MG tablet 2019 Self Yes Yes    Take 1 tablet by mouth At Night As Needed.    ibuprofen (ADVIL,MOTRIN) 600 MG tablet 2019 Self Yes Yes    Take 600 mg by mouth Every 8 (Eight) Hours As Needed for Mild Pain .    omeprazole (priLOSEC) 20 MG capsule 2019 Self Yes Yes    Take 20 mg by mouth Daily.    ondansetron ODT (ZOFRAN-ODT) 4 MG disintegrating tablet 2019 Self Yes Yes    DISSOLVE 1 TABLET IN MOUTH 3 TIMES A DAY AS NEEDED NAUSEA    Probiotic Product (PROBIOTIC-10 PO) 2019 Pharmacy Yes Yes    Probiotic 10 billion cell capsule   1 tab PO daily for diarrhea    traZODone (DESYREL) 100 MG tablet  Pharmacy Yes No    Take 100 mg by mouth Every Night.            Current Facility-Administered Medications:   •  acetaminophen (TYLENOL) tablet 650 mg, 650 mg, Oral, Q6H PRN, Jenn Knox PA-C, 650 mg at 19 4177  •  budesonide-formoterol (SYMBICORT) 160-4.5 MCG/ACT inhaler 2 puff, 2 puff, Inhalation, BID - RT, Jenn Knox PA-C, 2 puff at 19 0852  •  enoxaparin (LOVENOX) syringe  40 mg, 40 mg, Subcutaneous, Q24H, Jenn Knox PA-C, 40 mg at 11/19/19 0910  •  ipratropium-albuterol (DUO-NEB) nebulizer solution 3 mL, 3 mL, Nebulization, Q4H PRN, Jenn Knox PA-C  •  lactobacillus acidophilus (RISAQUAD) capsule 1 capsule, 1 capsule, Oral, Daily, Jenn Knox PA-C, 1 capsule at 11/19/19 0910  •  melatonin tablet 5 mg, 5 mg, Oral, Nightly PRN, Jenn Knox PA-C  •  nicotine (NICODERM CQ) 21 MG/24HR patch 1 patch, 1 patch, Transdermal, Q24H, Jenn Knox PA-C, 1 patch at 11/19/19 0911  •  nicotine (NICODERM CQ) 21 MG/24HR patch 1 patch, 1 patch, Transdermal, Once, Sarah Valadez MD, 1 patch at 11/18/19 2246  •  ondansetron (ZOFRAN) tablet 4 mg, 4 mg, Oral, Q6H PRN **OR** ondansetron (ZOFRAN) injection 4 mg, 4 mg, Intravenous, Q6H PRN, Jenn Knox PA-C  •  pantoprazole (PROTONIX) EC tablet 40 mg, 40 mg, Oral, QAM, Jenn Knox, PA-C, 40 mg at 11/19/19 0735  •  Pharmacy Consult - [facility], , Does not apply, Daily, Dorene Ferrer, [facility]  •  sodium chloride 0.9 % flush 10 mL, 10 mL, Intravenous, PRN, Loyd Beal MD  •  sodium chloride 0.9 % flush 10 mL, 10 mL, Intravenous, Q12H, Jenn Knox PA-C, 10 mL at 11/19/19 0911  •  sodium chloride 0.9 % flush 10 mL, 10 mL, Intravenous, PRN, Jenn Knox PA-C    Social History     Socioeconomic History   • Marital status:      Spouse name: Not on file   • Number of children: Not on file   • Years of education: Not on file   • Highest education level: Not on file   Tobacco Use   • Smoking status: Current Every Day Smoker     Packs/day: 1.00     Years: 50.00     Pack years: 50.00     Types: Cigarettes   • Smokeless tobacco: Never Used   Substance and Sexual Activity   • Alcohol use: Yes     Frequency: Never     Comment: occasional   • Drug use: No       Family History   Problem Relation Age of Onset   • Arthritis Mother    • Arthritis Father    • Hypertension Father    • Heart failure Father    • Coronary artery  disease Brother        REVIEW OF SYSTEMS:   CONST:  No weight loss, fever, + chills, + weakness + fatigue.   HEENT:  No visual loss, blurred vision, double vision, yellow sclerae.                   No hearing loss, congestion, sore throat.   SKIN:      No rashes, urticaria, ulcers, sores.     RESP:     + shortness of breath, no hemoptysis, + mild cough (much improved)  CARD:    No chest pain, palpitations, + PND  GI:           No anorexia, + nausea, no vomiting, diarrhea. No abdominal pain, melena.   :         No burning on urination, hematuria or increased frequency.  ENDO:    No diaphoresis, cold or heat intolerance. No polyuria or polydipsia.   NEURO:  No headache, dizziness, syncope, paralysis, ataxia, or parasthesias.                  No change in bowel or bladder control. No history of CVA/TIA  MUSC:    No muscle, back pain, joint pain or stiffness.   HEME:    No anemia, bleeding, bruising. No history of DVT/PE.  PSYCH:  No history of depression, anxiety    OBJECTIVE:    Vitals:    11/19/19 0324 11/19/19 0611 11/19/19 0730 11/19/19 0852   BP: 135/95  133/96    BP Location: Right arm  Left arm    Patient Position: Sitting  Sitting    Pulse: 108  102 99   Resp: 18  18 20   Temp: 98 °F (36.7 °C)  98 °F (36.7 °C)    TempSrc: Oral  Oral    SpO2:   95%    Weight:  94.3 kg (207 lb 14.4 oz)     Height:                Vital Sign Min/Max for last 24 hours  Temp  Min: 97.9 °F (36.6 °C)  Max: 98.6 °F (37 °C)   BP  Min: 117/96  Max: 135/95   Pulse  Min: 70  Max: 108   Resp  Min: 18  Max: 24   SpO2  Min: 79 %  Max: 95 %   Flow (L/min)  Min: 2  Max: 2      Intake/Output Summary (Last 24 hours) at 11/19/2019 1013  Last data filed at 11/19/2019 0900  Gross per 24 hour   Intake 600 ml   Output 1450 ml   Net -850 ml             Physical Exam:  GEN: Well nourished, well-developed, no acute distress  HEENT: Normocephalic, atraumatic, PERRLA, moist mucous membranes  NECK: Supple, + JVP, no thyromegaly, no lymphadenopathy, no  carotid bruits  CARD: S1S2, regular rhythm, tachycardic, holosystolic murmur, no gallop, or rub, + DP pulses bilaterally, 2+ radial pulses bilaterally  LUNGS: Bibasilar rales, mild by lateral wheezes, normal respiratory effort on supplemental oxygen  ABDOMEN: Soft, nontender, normal bowel sounds  EXTREMITIES: No gross deformities, no clubbing, cyanosis, 1-2+ LE edema (L>R)  SKIN: Warm, dry, without rash  NEURO: No focal deficits, cranial nerves II through XII grossly intact, alert and oriented x 3  PSYCHIATRIC: Normal affect and mood      Data:   Results from last 7 days   Lab Units 11/19/19  0724 11/18/19  1632   WBC 10*3/mm3 8.74 8.90   HEMOGLOBIN g/dL 16.0 15.9   HEMATOCRIT % 49.8 50.5   PLATELETS 10*3/mm3 176 197     Results from last 7 days   Lab Units 11/19/19  0724 11/18/19  1632   SODIUM mmol/L 134* 135*   POTASSIUM mmol/L 4.7 5.0   CHLORIDE mmol/L 96* 95*   CO2 mmol/L 24.0 27.0   BUN mg/dL 16 18   CREATININE mg/dL 0.92 1.03   GLUCOSE mg/dL 103* 144*      Results from last 7 days   Lab Units 11/19/19  0724   HEMOGLOBIN A1C % 5.50     Results from last 7 days   Lab Units 11/19/19  0724   TSH uIU/mL 2.060     Results from last 7 days   Lab Units 11/18/19  1632   PROBNP pg/mL 4,882.0*         Results from last 7 days   Lab Units 11/18/19  1632   TROPONIN T ng/mL <0.010     Results from last 7 days   Lab Units 11/19/19  0724   CHOLESTEROL mg/dL 148   TRIGLYCERIDES mg/dL 115   HDL CHOL mg/dL 36*   LDL CHOL mg/dL 89     Results from last 7 days   Lab Units 11/18/19  1632   PROBNP pg/mL 4,882.0*       Intake/Output Summary (Last 24 hours) at 11/19/2019 1013  Last data filed at 11/19/2019 0900  Gross per 24 hour   Intake 600 ml   Output 1450 ml   Net -850 ml       Chest X-Ray, image reviewed by myself  Imaging Results (Last 24 Hours)     Procedure Component Value Units Date/Time    XR Chest 1 View [[id number]] Collected:  11/18/19 1722     Updated:  11/19/19 0813    Narrative:       EXAMINATION: XR CHEST 1 VW-  11/18/2019     INDICATION: SOA triage protocol      COMPARISON: NONE     FINDINGS: Portable chest reveals heart to be enlarged. Ill-defined  opacification seen within the right lung base with small right pleural  effusion. Increased pulmonary vascularity bilaterally. Degenerative  changes seen within the spine.       Impression:       Heart is enlarged with ill-defined opacification at the  right lung base and increased pulmonary vascularity bilaterally.     D:  11/18/2019  E:  11/19/2019                Telemetry: ST, HR  bpm  EKG 11/18/19: Sinus tachycardia,  bpm, no significant ST-T wave abnormalities, image reviewed by myself         Assessment and Plan:   1. Acute systolic congestive heart failure, new diagnosis:  - Patient presents to the ED yesterday with a 1 month history of dsypnea, cough, and fatigue, treated for pneumonia with no significant improvement in symptoms.   - Hypoxic upon arrival with proBNP 4,882 and CXR demonstrating increased pulmonary vascularity bilaterally, LE swelling  - Feels significantly improved after one time dose IV lasix.  Still clinically appears fluid overloaded.    - Echocardiogram demonstrating EF 36-40%, moderate to severe MR, moderate TR  - Continue strict I/O's, daily weights    2.  Moderate to severe mitral valve regurgitation    3. Tobacco abuse, dependence:  -Advised immediate smoking cessation.  The risks to worsening lung and heart health was discussed with the patient if if he continues to smoke was discussed with the patient.  Different strategies of helping decrease smoking or stopping altogether were discussed with the patient.  He is interested and willing to trial a nicotine patch.  A total of 4 minutes was spent on this topic.    Plan:  1. Additional IV diuresis.  Will give 40 mg IV lasix x 2 today with repeat BMP tomorrow am. Strict I/Os, daily weights  2. Start Coreg 6.25 mg bid as well as Lisinopril 10 mg daily.  3. Recommend an ischemic  evaluation.  Keep NPO after midnight with plan for stress test tomorrow.  4.  Patient acknowledges the importance of stopping smoking for his lungs and heart healthy.  He is willing to continue a nicotine patch, likely will continue at time of discharge.    Scribed for Laurent Flores MD by Suad Knutson, CHARLI. 11/19/2019  1:12 PM      I, Laurent Flores MD, personally performed the services as scribed by the above named individual. I have made any necessary edits and it is both accurate and complete.     Laurent Flores MD, MSc, FACC  Interventional Cardiology  [facility] at [facility]

## 2019-11-19 NOTE — PLAN OF CARE
Problem: Cardiac: Heart Failure (Adult)  Goal: Signs and Symptoms of Listed Potential Problems Will be Absent, Minimized or Managed (Cardiac: Heart Failure)  Outcome: Ongoing (interventions implemented as appropriate)   11/19/19 045   Goal/Outcome Evaluation   Problems Assessed (Heart Failure) all   Problems Present (Heart Failure) respiratory compromise

## 2019-11-19 NOTE — NURSING NOTE
"Pt awakened bibiana out. Stated he has leg cramps. Takes Mansi's Leg Cramp Medication which is homeopathic OTC med. He also reported that a \"half teaspoon of salt in water\" would alleviate the cramps. He declined Melatonin for sleep and heat application. He was given approximately 4 oz of water with 2 salt packets in it by this writer.  "

## 2019-11-19 NOTE — H&P
[facility] Medicine Services  HISTORY AND PHYSICAL    Patient Name: Kb Walker  : 1955  MRN: [id number]  Primary Care Physician: Lonny Pugh DO  Date of admission: 2019      Subjective   Subjective     Chief Complaint:  Shortness of breath     HPI:  Kb Walker is a 64 y.o. male with a medical hx significant for smoking presented to [facility] w/ complaints of worsening SOB for approximately 1 month. The patient was diagnosed with bilateral pneumonia on CXR in the beginning of October by his PCP and was put on Levaquin. His symptoms did not improve and a second CXR was worse. He was then put on Doxycycline and given an injection of Rocephin in the office but was still c/o SOA. He had a CT scan that showed no infection. He followed up for the persistent SOB, as well as nausea, vomiting and diarrhea and was given IV steroids in office. He reports feeling better only for a few days. The  SOB returned over the weekend and the patient saw his PCP today. In the office he was saturating 78% on room air and was encouraged to come to the ED for further evaluation. He admits to chills, but has not taken his temperature at home. He reports a productive cough this weekend with clear sputum, wheezing and abdominal distention. He has been eating a bland diet due to the N/V and diarrhea. No hematemesis or blood in his stool. The patient denies chest pain, dizziness or headache. No urinary symptoms. No personal hx of heart disease and does not follow with cardiology. Father had CHF. Brother  at 74 from massive heart attack. The patient smokes 1 PPD for 50 years and drinks occasionally.     While in the ED, the patient was tachycardic, tachypneac and hypoxic on room air. Labs revealed pro BNP 4,882. CXR demonstrates enlarged heart with ill-defined opacification of right lung base and increased pulmonary vascularity bilaterally. He was given 40 mg of IV Lasix in the ED. He will  be admitted to the hospitalist service for further medical management.     Review of Systems   Constitutional: Positive for chills. Negative for diaphoresis, fatigue and fever.   HENT: Positive for congestion. Negative for sore throat and trouble swallowing.    Eyes: Negative for pain and visual disturbance.   Respiratory: Positive for cough (productive), shortness of breath and wheezing. Negative for chest tightness.    Cardiovascular: Negative for chest pain, palpitations and leg swelling.   Gastrointestinal: Positive for diarrhea, nausea and vomiting. Negative for abdominal pain, blood in stool and constipation.   Genitourinary: Negative for difficulty urinating and dysuria.   Musculoskeletal: Negative for back pain and gait problem.   Skin: Negative for rash and wound.   Neurological: Negative for dizziness, syncope, facial asymmetry, speech difficulty, weakness, light-headedness, numbness and headaches.   Hematological: Negative for adenopathy. Does not bruise/bleed easily.   Psychiatric/Behavioral: Negative for agitation and confusion.      Personal History     Past Medical History:   Diagnosis Date   • Arthritis    • Gall stones        History reviewed. No pertinent surgical history.    Family History: family history includes Arthritis in his father and mother; Coronary artery disease in his brother; Heart failure in his father; Hypertension in his father. Otherwise pertinent FHx was reviewed and unremarkable.     Social History:  reports that he has been smoking cigarettes.  He has a 50.00 pack-year smoking history. He has never used smokeless tobacco. He reports that he drinks alcohol. He reports that he does not use drugs.  Social History     Social History Narrative   • Not on file       Medications:  Available home medication information reviewed.    (Not in a hospital admission)    No Known Allergies    Objective   Objective     Vital Signs:   Temp:  [98.6 °F (37 °C)] 98.6 °F (37 °C)  Heart Rate:   [] 105  Resp:  [24] 24  BP: (120-134)/(84-99) 128/94        Physical Exam   Constitutional: Awake, alert, sitting up in bed   Eyes: PERRLA, sclerae anicteric, no conjunctival injection  HENT: NCAT, mucous membranes moist  Neck: Supple, no thyromegaly, no lymphadenopathy, trachea midline  Respiratory: Decreased right base and slight expiratory wheeze, nonlabored respirations   Cardiovascular: RRR, no murmurs appreciated, palpable pedal pulses bilaterally  Gastrointestinal: Positive bowel sounds, soft, nontender, no distention or guarding   Musculoskeletal: Trace bilateral ankle edema, no clubbing or cyanosis to extremities  Psychiatric: Appropriate affect, cooperative  Neurologic: Oriented x 3, strength symmetric in all extremities, Cranial Nerves grossly intact to confrontation, speech clear  Skin: No rashes or wounds    Results Reviewed:  I have personally reviewed current lab and radiology data.    Results from last 7 days   Lab Units 11/18/19  1632   WBC 10*3/mm3 8.90   HEMOGLOBIN g/dL 15.9   HEMATOCRIT % 50.5   PLATELETS 10*3/mm3 197     Results from last 7 days   Lab Units 11/18/19  1632   SODIUM mmol/L 135*   POTASSIUM mmol/L 5.0   CHLORIDE mmol/L 95*   CO2 mmol/L 27.0   BUN mg/dL 18   CREATININE mg/dL 1.03   GLUCOSE mg/dL 144*   CALCIUM mg/dL 9.4   ALT (SGPT) U/L 33   AST (SGOT) U/L 39   TROPONIN T ng/mL <0.010   PROBNP pg/mL 4,882.0*     Estimated Creatinine Clearance: 81.9 mL/min (by C-G formula based on SCr of 1.03 mg/dL).  Brief Urine Lab Results     None        Imaging Results (Last 24 Hours)     Procedure Component Value Units Date/Time    XR Chest 1 View [[id number]] Collected:  11/18/19 1722     Updated:  11/18/19 1723    Narrative:          EXAMINATION: XR CHEST 1 VW-      INDICATION: SOA triage protocol      COMPARISON: NONE     FINDINGS: Portable chest reveals heart to be enlarged. Ill-defined  opacification seen within the right lung base with small right pleural  effusion. Increased  pulmonary vascularity bilaterally. Degenerative  changes seen within the spine           Impression:       Heart is enlarged with ill-defined opacification at the  right lung base and increased pulmonary vascularity bilaterally.               Assessment/Plan   Assessment / Plan     Active Hospital Problems    Diagnosis POA   • **Acute respiratory failure with hypoxia (CMS/[facility]) [J96.01] Yes   • CHF exacerbation (CMS/[facility]) [I50.9] Yes   • COPD (chronic obstructive pulmonary disease) (CMS/[facility]) [J44.9] Yes   • Tobacco use [Z72.0] Yes   • Hyperglycemia [R73.9] Yes      Kb Walker is a 64 y.o. male with a medical hx significant for COPD presented to [facility] w/ complaints of worsening SOB for approximately 1 month.     Acute respiratory failure  Unspecified CHF exacerbation  -- Pro BNP 4,882  -- CXR demonstrates enlarged heart with ill-defined opacification of right lung base and increased pulmonary vascularity bilaterally   -- Echo tomorrow   -- Consult cardiology  -- Received 40mg of IV Lasix in the ED. Good urine output w/ clinical improvement. No further diuresis overnight.   -- Bailey weights, strict I&Os  -- Heart failure navigator   --  Monitor pulse ox     Likely (undiagnosed) COPD  -- Continue Symbicort  -- Duo-nebs as needed   -- Incentive spirometry   --Dr. Mukherjee to consider outpatient convalescent spirometry  --Strong encouragement to quit smoking    Hyperglycemia  -- Check an A1c     Tobacco use  -- Smokes 1 PPD x 50 years  -- Smoking cessation education  -- Nicotine replacement     DVT prophylaxis:  Lovenox     CODE STATUS:    Code Status and Medical Interventions:   Ordered at: 11/18/19 2029     Level Of Support Discussed With:    Patient     Code Status:    CPR     Medical Interventions (Level of Support Prior to Arrest):    Full     Admission Status:  I believe this patient meets INPATIENT status due to acute hypoxic respiratory failure due to new onset heart failure.  I feel patient’s risk for adverse  outcomes and need for care warrant INPATIENT evaluation and I predict the patient’s care encounter to likely last beyond 2 midnights.    Electronically signed by Jenn Knox PA-C, 11/18/19, 8:00 PM.      Attending   Admission Attestation       I have seen and examined the patient, performing an independent face-to-face diagnostic evaluation with plan of care reviewed and developed with the advanced practice clinician (APC).      Brief Summary Statement:   Kb Walker is a 64 y.o. male with PMH significant for smoking.  He presented to [facility] ER today at the advice of his PCP for RA sat 78%.  He has a 3 week history of cough (productive) and SOA.  He has had PNA before and felt his illness was consistent with PNA.  He saw his PCP and had a CXR, was diagnosed with bibasilar PNA and put on Levaquin.  He felt somewhat improved (not resolved) but quickly worsened again.  He again saw a provider at his PCP clinic and was put on doxycyclline when his CXR did not show resolution.  Later that week, he was still not improved and put on oral steroids.  He has a CT scan that showed clear lungs, no PNA.    Eventually he saw Dr. Mukherjee and was given a steroid shot.  He felt much improved but over the last several days felt SOA again.  He had subjective fevers early on in his illness, but not recently.  He went to follow up with Dr. Mukherjee today and his RA sat was 78%.  He was sent to the ER for further eval and treatment.      Of note, he has never been diagnosed with COPD.    ProBNP 4882.  CXR showed cardiomegaly with ill defined opacification right lung base and increased pulmonary vascularity bilaterally.  WBC 8.9K.  Troponin negative.      He received 40 mg lasix in the ER IV and feels markedly improved.    Remainder of detailed HPI is as noted by APC and has been reviewed and/or edited by me for completeness.    Attending Physical Exam:  Constitutional: Awake, alert  Eyes: PERRLA, sclerae anicteric, no conjunctival  injection  HENT: NCAT, mucous membranes moist  Neck: Supple, no thyromegaly, no lymphadenopathy, trachea midline  Respiratory: faint crackles in bases to auscultation bilaterally, nonlabored respirations   Cardiovascular: RRR, no murmurs, rubs, or gallops, palpable pedal pulses bilaterally  Gastrointestinal: Positive bowel sounds, soft, nontender, nondistended  Musculoskeletal: Tr bilateral ankle edema, no clubbing or cyanosis to extremities  Psychiatric: Appropriate affect, cooperative  Neurologic: Oriented x 3, strength symmetric in all extremities, Cranial Nerves grossly intact to confrontation, speech clear  Skin: No rashes      Brief Assessment/Plan :  See detailed assessment and plan developed with APC which I have reviewed and/or edited for completeness.    Electronically signed by Sarah Valadez MD, 11/18/19, 9:41 PM.

## 2019-11-19 NOTE — PLAN OF CARE
Problem: Cardiac: Heart Failure (Adult)  Goal: Signs and Symptoms of Listed Potential Problems Will be Absent, Minimized or Managed (Cardiac: Heart Failure)  Outcome: Ongoing (interventions implemented as appropriate)  Patient complains of shortness of breath with any activity.  Patient reluctant to lay flat due to shortness of breath.  Patient agitated and wants to go home, MD aware.  Patient to have stress test in AM and revisit discharge

## 2019-11-20 ENCOUNTER — APPOINTMENT (OUTPATIENT)
Dept: CARDIOLOGY | Facility: HOSPITAL | Age: 64
End: 2019-11-20

## 2019-11-20 VITALS
DIASTOLIC BLOOD PRESSURE: 82 MMHG | RESPIRATION RATE: 16 BRPM | OXYGEN SATURATION: 95 % | WEIGHT: 208.34 LBS | BODY MASS INDEX: 30.86 KG/M2 | TEMPERATURE: 98.1 F | SYSTOLIC BLOOD PRESSURE: 113 MMHG | HEART RATE: 99 BPM | HEIGHT: 69 IN

## 2019-11-20 LAB
ANION GAP SERPL CALCULATED.3IONS-SCNC: 11 MMOL/L (ref 5–15)
BH CV STRESS BP STAGE 1: NORMAL
BH CV STRESS BP STAGE 3: NORMAL
BH CV STRESS COMMENTS STAGE 1: NORMAL
BH CV STRESS DOSE REGADENOSON STAGE 1: 0.4
BH CV STRESS DURATION MIN STAGE 1: 1
BH CV STRESS DURATION MIN STAGE 2: 1
BH CV STRESS DURATION MIN STAGE 3: 1
BH CV STRESS DURATION MIN STAGE 4: 1
BH CV STRESS DURATION SEC STAGE 1: 0
BH CV STRESS DURATION SEC STAGE 2: 0
BH CV STRESS DURATION SEC STAGE 3: 0
BH CV STRESS DURATION SEC STAGE 4: 0
BH CV STRESS HR STAGE 1: 91
BH CV STRESS HR STAGE 2: 96
BH CV STRESS HR STAGE 3: 96
BH CV STRESS HR STAGE 4: 95
BH CV STRESS O2 STAGE 1: 96
BH CV STRESS O2 STAGE 2: 96
BH CV STRESS O2 STAGE 3: 95
BH CV STRESS O2 STAGE 4: 96
BH CV STRESS PROTOCOL 1: NORMAL
BH CV STRESS RECOVERY BP: NORMAL MMHG
BH CV STRESS RECOVERY HR: 94 BPM
BH CV STRESS RECOVERY O2: 94 %
BH CV STRESS STAGE 1: 1
BH CV STRESS STAGE 2: 2
BH CV STRESS STAGE 3: 3
BH CV STRESS STAGE 4: 4
BUN BLD-MCNC: 17 MG/DL (ref 8–23)
BUN/CREAT SERPL: 16.5 (ref 7–25)
CALCIUM SPEC-SCNC: 8.7 MG/DL (ref 8.6–10.5)
CHLORIDE SERPL-SCNC: 102 MMOL/L (ref 98–107)
CO2 SERPL-SCNC: 29 MMOL/L (ref 22–29)
CREAT BLD-MCNC: 1.03 MG/DL (ref 0.76–1.27)
GFR SERPL CREATININE-BSD FRML MDRD: 72 ML/MIN/1.73
GLUCOSE BLD-MCNC: 117 MG/DL (ref 65–99)
LV EF NUC BP: 36 %
MAXIMAL PREDICTED HEART RATE: 154 BPM
PERCENT MAX PREDICTED HR: 64.94 %
POTASSIUM BLD-SCNC: 4.6 MMOL/L (ref 3.5–5.2)
SODIUM BLD-SCNC: 142 MMOL/L (ref 136–145)
STRESS BASELINE BP: NORMAL MMHG
STRESS BASELINE HR: 93 BPM
STRESS O2 SAT REST: 97 %
STRESS PERCENT HR: 76 %
STRESS POST ESTIMATED WORKLOAD: 1 METS
STRESS POST EXERCISE DUR MIN: 4 MIN
STRESS POST EXERCISE DUR SEC: 0 SEC
STRESS POST O2 SAT PEAK: 96 %
STRESS POST PEAK BP: NORMAL MMHG
STRESS POST PEAK HR: 100 BPM
STRESS TARGET HR: 131 BPM

## 2019-11-20 PROCEDURE — 25010000002 REGADENOSON 0.4 MG/5ML SOLUTION: Performed by: NURSE PRACTITIONER

## 2019-11-20 PROCEDURE — 25010000002 ENOXAPARIN PER 10 MG: Performed by: PHYSICIAN ASSISTANT

## 2019-11-20 PROCEDURE — 99232 SBSQ HOSP IP/OBS MODERATE 35: CPT | Performed by: PHYSICIAN ASSISTANT

## 2019-11-20 PROCEDURE — 93018 CV STRESS TEST I&R ONLY: CPT | Performed by: INTERNAL MEDICINE

## 2019-11-20 PROCEDURE — 80048 BASIC METABOLIC PNL TOTAL CA: CPT | Performed by: NURSE PRACTITIONER

## 2019-11-20 PROCEDURE — 93017 CV STRESS TEST TRACING ONLY: CPT

## 2019-11-20 PROCEDURE — 0 RUBIDIUM CHLORIDE: Performed by: NURSE PRACTITIONER

## 2019-11-20 PROCEDURE — A9555 RB82 RUBIDIUM: HCPCS | Performed by: NURSE PRACTITIONER

## 2019-11-20 PROCEDURE — 99232 SBSQ HOSP IP/OBS MODERATE 35: CPT | Performed by: INTERNAL MEDICINE

## 2019-11-20 PROCEDURE — 94799 UNLISTED PULMONARY SVC/PX: CPT

## 2019-11-20 PROCEDURE — 78492 MYOCRD IMG PET MLT RST&STRS: CPT

## 2019-11-20 PROCEDURE — 99238 HOSP IP/OBS DSCHRG MGMT 30/<: CPT | Performed by: NURSE PRACTITIONER

## 2019-11-20 PROCEDURE — 78492 MYOCRD IMG PET MLT RST&STRS: CPT | Performed by: INTERNAL MEDICINE

## 2019-11-20 RX ORDER — CARVEDILOL 6.25 MG/1
6.25 TABLET ORAL 2 TIMES DAILY WITH MEALS
Qty: 60 TABLET | Refills: 11 | Status: SHIPPED | OUTPATIENT
Start: 2019-11-20 | End: 2020-01-22

## 2019-11-20 RX ORDER — LISINOPRIL 10 MG/1
10 TABLET ORAL
Qty: 30 TABLET | Refills: 11 | Status: SHIPPED | OUTPATIENT
Start: 2019-11-21 | End: 2020-10-05 | Stop reason: SDUPTHER

## 2019-11-20 RX ORDER — CINCHONA OFFICINALIS BARK, ACONITUM NAPELLUS, PSEUDOGNAPHALIUM OBTUSIFOLIUM, LEDUM PALUSTRE TWIG, MAGNESIUM PHOSPHATE, DIBASIC TRIHYDRATE, TOXICODENDRON PUBESCENS LEAF, AND VISCUM ALBUM FRUITING TOP 3; 6; 3; 6; 6; 6; 3 [HP_X]/1; [HP_X]/1; [HP_X]/1; [HP_X]/1; [HP_X]/1; [HP_X]/1; [HP_X]/1
1 TABLET, SOLUBLE ORAL 3 TIMES DAILY PRN
Status: DISCONTINUED | OUTPATIENT
Start: 2019-11-20 | End: 2019-11-20 | Stop reason: HOSPADM

## 2019-11-20 RX ORDER — FUROSEMIDE 40 MG/1
40 TABLET ORAL DAILY
Qty: 30 TABLET | Refills: 11 | Status: SHIPPED | OUTPATIENT
Start: 2019-11-20 | End: 2020-01-22 | Stop reason: SDUPTHER

## 2019-11-20 RX ORDER — NICOTINE 21-14-7MG
1 KIT TRANSDERMAL DAILY
Qty: 1 EACH | Refills: 0 | Status: SHIPPED | OUTPATIENT
Start: 2019-11-20 | End: 2020-01-22 | Stop reason: ALTCHOICE

## 2019-11-20 RX ORDER — NICOTINE 21 MG/24HR
1 PATCH, TRANSDERMAL 24 HOURS TRANSDERMAL
Qty: 30 EACH | Refills: 1 | Status: CANCELLED | OUTPATIENT
Start: 2019-11-21

## 2019-11-20 RX ADMIN — RUBIDIUM CHLORIDE RB-82 1 DOSE: 150 INJECTION, SOLUTION INTRAVENOUS at 09:25

## 2019-11-20 RX ADMIN — SODIUM CHLORIDE, PRESERVATIVE FREE 10 ML: 5 INJECTION INTRAVENOUS at 10:10

## 2019-11-20 RX ADMIN — BUDESONIDE AND FORMOTEROL FUMARATE DIHYDRATE 2 PUFF: 160; 4.5 AEROSOL RESPIRATORY (INHALATION) at 07:21

## 2019-11-20 RX ADMIN — PANTOPRAZOLE SODIUM 40 MG: 40 TABLET, DELAYED RELEASE ORAL at 07:37

## 2019-11-20 RX ADMIN — LISINOPRIL 10 MG: 10 TABLET ORAL at 10:08

## 2019-11-20 RX ADMIN — Medication 1 CAPSULE: at 10:08

## 2019-11-20 RX ADMIN — CINCHONA OFFICINALIS BARK, ACONITUM NAPELLUS, PSEUDOGNAPHALIUM OBTUSIFOLIUM, LEDUM PALUSTRE TWIG, MAGNESIUM PHOSPHATE, DIBASIC TRIHYDRATE, TOXICODENDRON PUBESCENS LEAF, AND VISCUM ALBUM FRUITING TOP 1 TABLET: 3; 6; 3; 6; 6; 6; 3 TABLET, SOLUBLE ORAL at 04:42

## 2019-11-20 RX ADMIN — ENOXAPARIN SODIUM 40 MG: 40 INJECTION SUBCUTANEOUS at 10:09

## 2019-11-20 RX ADMIN — REGADENOSON 0.4 MG: 0.08 INJECTION, SOLUTION INTRAVENOUS at 09:40

## 2019-11-20 RX ADMIN — NICOTINE 1 PATCH: 21 PATCH, EXTENDED RELEASE TRANSDERMAL at 10:11

## 2019-11-20 RX ADMIN — CARVEDILOL 6.25 MG: 6.25 TABLET, FILM COATED ORAL at 07:37

## 2019-11-20 RX ADMIN — RUBIDIUM CHLORIDE RB-82 1 DOSE: 150 INJECTION, SOLUTION INTRAVENOUS at 09:43

## 2019-11-20 NOTE — PROGRESS NOTES
[facility] Medicine Services  PROGRESS NOTE    Patient Name: Kb Walker  : 1955  MRN: [id number]    Date of Admission: 2019  Primary Care Physician: Lonny Pugh DO    Subjective   Subjective     CC:  Dyspnea x weeks    HPI:  Breathing much better and off oxygen.  Had PET stress this morning.  No chest pain.  Hoping for discharge.     Review of Systems   Gen- No fevers, chills  CV- No chest pain, palpitations.   Resp- No cough.  dysp is better.    GI- No N/V/D, abd pain.  Hungry          Objective   Objective     Vital Signs:   Temp:  [98.1 °F (36.7 °C)-98.3 °F (36.8 °C)] 98.1 °F (36.7 °C)  Heart Rate:  [] 74  Resp:  [18] 18  BP: (118-131)/(84-95) 118/89        Physical Exam:  Gen:  WD/WN man in NAD sitting up on EOB, family present.  Off oxygen.  Breathing nonlabored as he moves and talks.   Neuro: alert and oriented, clear speech, follows commands, grossly nonfocal  HEENT:  NC/AT PERRL, OP benign  Neck:  Supple, no LAD,   Heart RRR no murmur, rub, or gallop  Lungs no crackles,  no wheeze, nonlabored on NC ox  Abd:  Soft, nontender, no rebound or guarding, pos BS  Extrem:  No c/c. Edema is nearly resolved.   Skin warm and dry    Results Reviewed:    Results from last 7 days   Lab Units 19  0724 19  1632   WBC 10*3/mm3 8.74 8.90   HEMOGLOBIN g/dL 16.0 15.9   HEMATOCRIT % 49.8 50.5   PLATELETS 10*3/mm3 176 197     Results from last 7 days   Lab Units 19  0706 19  0724 19  1632   SODIUM mmol/L 142 134* 135*   POTASSIUM mmol/L 4.6 4.7 5.0   CHLORIDE mmol/L 102 96* 95*   CO2 mmol/L 29.0 24.0 27.0   BUN mg/dL 17 16 18   CREATININE mg/dL 1.03 0.92 1.03   GLUCOSE mg/dL 117* 103* 144*   CALCIUM mg/dL 8.7 8.9 9.4   ALT (SGPT) U/L  --   --  33   AST (SGOT) U/L  --   --  39   TROPONIN T ng/mL  --   --  <0.010   PROBNP pg/mL  --   --  4,882.0*     Estimated Creatinine Clearance: 80 mL/min (by C-G formula based on SCr of 1.03  mg/dL).    Microbiology Results Abnormal     None          Imaging Results (Last 24 Hours)     ** No results found for the last 24 hours. **          Results for orders placed during the hospital encounter of 11/18/19   Adult Transthoracic Echo Complete With Contrast if Necessary Per Protocol    Narrative · Left ventricular systolic function is moderately decreased. Estimated EF   appears to be in the range of 36 - 40%.  · There is left ventricular global hypokinesis noted.  · Left ventricular diastolic dysfunction (grade III) consistent with   reversible restrictive pattern.  · Right ventricular cavity is borderline dilated.  · Left atrial cavity size is severely dilated.  · Right atrial cavity size is moderately dilated.  · Moderate-to-severe mitral valve regurgitation is present  · Mild tricuspid valve regurgitation is present.  · Estimated right ventricular systolic pressure from tricuspid   regurgitation is mildly elevated (35-45 mmHg).          I have reviewed the medications:  Scheduled Meds:    budesonide-formoterol 2 puff Inhalation BID - RT   carvedilol 6.25 mg Oral BID With Meals   enoxaparin 40 mg Subcutaneous Q24H   lactobacillus acidophilus 1 capsule Oral Daily   lisinopril 10 mg Oral Q24H   nicotine 1 patch Transdermal Q24H   pantoprazole 40 mg Oral QAM   pharmacy consult - MTM  Does not apply Daily   sodium chloride 10 mL Intravenous Q12H     Continuous Infusions:   PRN Meds:.•  acetaminophen  •  ipratropium-albuterol  •  LEG CRAMPS  •  melatonin  •  ondansetron **OR** ondansetron  •  sodium chloride  •  sodium chloride      Assessment/Plan   Assessment / Plan     Active Hospital Problems    Diagnosis  POA   • **Acute respiratory failure with hypoxia (CMS/HCC) [J96.01]  Yes   • Tobacco use [Z72.0]  Yes   • Hyperglycemia [R73.9]  Yes   • Acute CHF (CMS/HCC) [I50.9]  Yes      Resolved Hospital Problems   No resolved problems to display.        Brief Hospital Course to date:  Kb Walker is a 64 y.o.  male with hx significant for smoking.  3-4 weeks of dyspnea, treated with abx for presumed pna, then had clear CT by report, now presented to [facility] w/ complaints of worsening SOB.  Sat 78% on presentation and CXR suggests CHF.      Acute respiratory failure, hypoxic  CHF exacerbation:  Systolic/diastolic  - EF 36-40 with severe MVR and LA dilation; global LV hypokinesis; diast dysfxn  - Got lasix with improvement  - cards consult pending  - despite tobacco, start BB.   - lowdose ACEI for now; consider Entresto  -awaiting PET results.   -  A1C and LDL normal.      Likely (undiagnosed) COPD  -- Continue Symbicort  -- Duo-nebs as needed     --Dr. Mukherjee to consider outpatient convalescent spirometry  --Strong encouragement to quit smoking      Tobacco use: Smokes 1 PPD x 50 years.  Counseled.  Patch.     Disposition: I expect the patient to be discharged today.     CODE STATUS:   Code Status and Medical Interventions:   Ordered at: 11/18/19 2029     Level Of Support Discussed With:    Patient     Code Status:    CPR     Medical Interventions (Level of Support Prior to Arrest):    Full         Electronically signed by Deneen Li MD, 11/20/19, 2:10 PM.

## 2019-11-20 NOTE — PLAN OF CARE
"Problem: Patient Care Overview  Goal: Plan of Care Review  Outcome: Ongoing (interventions implemented as appropriate)   11/20/19 6023   Coping/Psychosocial   Plan of Care Reviewed With patient;spouse   Plan of Care Review   Progress declining   OTHER   Outcome Summary Pt has frequent c/o \"leg cramps\". He has been s/w unstable when standing and using bedside table for bracing himself; he has been falling asleep sitting on the side of the bed and is now a fall risk         "

## 2019-11-20 NOTE — DISCHARGE SUMMARY
[facility] Medicine Services  DISCHARGE SUMMARY    Patient Name: Kb Walker  : 1955  MRN: [id number]    Date of Admission: 2019  4:17 PM  Date of Discharge:  19  Primary Care Physician: Lonny Pugh DO    Consults     Date and Time Order Name Status Description    2019 0030 Inpatient Cardiology Consult Completed           Hospital Course     Presenting Problem:   Acute respiratory failure with hypoxia (CMS/HCC) [J96.01]    Active Hospital Problems    Diagnosis  POA   • **Acute respiratory failure with hypoxia (CMS/HCC) [J96.01]  Yes   • Tobacco use [Z72.0]  Yes   • Hyperglycemia [R73.9]  Yes   • Acute CHF (CMS/HCC) [I50.9]  Yes      Resolved Hospital Problems   No resolved problems to display.          Hospital Course:  Kb Walker is a 64 y.o. male, longterm smoker, who presented to [facility] after several weeks of progressive dyspnea.  He had been treated with antibiotics outpatient and had a chest CT which reportedly showed no infiltrate.  On presentation, he had lower extremity edema, elevated BNP, and a CXR suggesting vascular congestion.  He had no prior diagnosis of CHF.  He was admitted for management.      With IV furosemide he diuresed well and felt much better.  An echo showed EF 35-40 with moderate to severe MV regurgitation.  He was seen by cardiologist Dr. Flores and had a PET stress test which showed EF 36% with normal perfusion.  Lisinopril, Lasix and carvedilol were started.    We discussed his tobacco habit and he will try to quit with the help of nicotine patches.        Discharge Follow Up Recommendations for labs/diagnostics:  Heart and valve clinic 1 week  Dr. Flores 6 weeks    Day of Discharge     HPI:   Feeling better no pain    Review of Systems  Gen- No fevers, chills  CV- No chest pain, palpitations  Resp- No cough, dyspnea  GI- No N/V/D, abd pain      Otherwise ROS is negative except as mentioned in the HPI.    Vital Signs:    Temp:  [98.1 °F (36.7 °C)-98.2 °F (36.8 °C)] 98.1 °F (36.7 °C)  Heart Rate:  [] 99  Resp:  [16-18] 16  BP: (113-131)/(82-95) 113/82     Physical Exam:  Gen:  WD/WN man in NAD sitting up on EOB, family present.  Off oxygen.  Breathing nonlabored as he moves and talks.   Neuro: alert and oriented, clear speech, follows commands, grossly nonfocal  HEENT:  NC/AT PERRL, OP benign  Neck:  Supple, no LAD,   Heart RRR no murmur, rub, or gallop  Lungs no crackles,  no wheeze, nonlabored on NC ox  Abd:  Soft, nontender, no rebound or guarding, pos BS  Extrem:  No c/c. Edema is nearly resolved.   Skin warm and dry    Pertinent  and/or Most Recent Results     Results from last 7 days   Lab Units 11/20/19  0706 11/19/19  0724 11/18/19  1632   WBC 10*3/mm3  --  8.74 8.90   HEMOGLOBIN g/dL  --  16.0 15.9   HEMATOCRIT %  --  49.8 50.5   PLATELETS 10*3/mm3  --  176 197   SODIUM mmol/L 142 134* 135*   POTASSIUM mmol/L 4.6 4.7 5.0   CHLORIDE mmol/L 102 96* 95*   CO2 mmol/L 29.0 24.0 27.0   BUN mg/dL 17 16 18   CREATININE mg/dL 1.03 0.92 1.03   GLUCOSE mg/dL 117* 103* 144*   CALCIUM mg/dL 8.7 8.9 9.4     Results from last 7 days   Lab Units 11/18/19  1632   BILIRUBIN mg/dL 0.9   ALK PHOS U/L 68   ALT (SGPT) U/L 33   AST (SGOT) U/L 39     Results from last 7 days   Lab Units 11/19/19  0724   CHOLESTEROL mg/dL 148   TRIGLYCERIDES mg/dL 115   HDL CHOL mg/dL 36*     Results from last 7 days   Lab Units 11/19/19  0724 11/18/19  1632   TSH uIU/mL 2.060  --    HEMOGLOBIN A1C % 5.50  --    PROBNP pg/mL  --  4,882.0*   TROPONIN T ng/mL  --  <0.010       Brief Urine Lab Results     None          Microbiology Results Abnormal     None          Imaging Results (All)     Procedure Component Value Units Date/Time    XR Chest 1 View [[id number]] Collected:  11/18/19 1722     Updated:  11/19/19 0813    Narrative:       EXAMINATION: XR CHEST 1 VW- 11/18/2019     INDICATION: SOA triage protocol      COMPARISON: NONE     FINDINGS: Portable chest  reveals heart to be enlarged. Ill-defined  opacification seen within the right lung base with small right pleural  effusion. Increased pulmonary vascularity bilaterally. Degenerative  changes seen within the spine.       Impression:       Heart is enlarged with ill-defined opacification at the  right lung base and increased pulmonary vascularity bilaterally.     D:  11/18/2019  E:  11/19/2019                          Results for orders placed during the hospital encounter of 11/18/19   Adult Transthoracic Echo Complete With Contrast if Necessary Per Protocol    Narrative · Left ventricular systolic function is moderately decreased. Estimated EF   appears to be in the range of 36 - 40%.  · There is left ventricular global hypokinesis noted.  · Left ventricular diastolic dysfunction (grade III) consistent with   reversible restrictive pattern.  · Right ventricular cavity is borderline dilated.  · Left atrial cavity size is severely dilated.  · Right atrial cavity size is moderately dilated.  · Moderate-to-severe mitral valve regurgitation is present  · Mild tricuspid valve regurgitation is present.  · Estimated right ventricular systolic pressure from tricuspid   regurgitation is mildly elevated (35-45 mmHg).            Discharge Details        Discharge Medications      New Medications      Instructions Start Date   carvedilol 6.25 MG tablet  Commonly known as:  COREG   6.25 mg, Oral, 2 Times Daily With Meals      furosemide 40 MG tablet  Commonly known as:  LASIX   40 mg, Oral, Daily      lisinopril 10 MG tablet  Commonly known as:  PRINIVIL,ZESTRIL   10 mg, Oral, Every 24 Hours Scheduled   Start Date:  11/21/2019     Nicotine 21-14-7 MG/24HR kit   1 patch, Transdermal, Daily, Take as directed         Continue These Medications      Instructions Start Date   acetaminophen 325 MG tablet  Commonly known as:  TYLENOL   650 mg, Oral, Every 6 Hours PRN      albuterol sulfate  (90 Base) MCG/ACT inhaler  Commonly  known as:  PROVENTIL HFA;VENTOLIN HFA;PROAIR HFA   2 puffs, Inhalation, Every 4 Hours PRN      budesonide-formoterol 160-4.5 MCG/ACT inhaler  Commonly known as:  SYMBICORT   2 puffs, Inhalation, 2 Times Daily - RT      diphenhydrAMINE 50 MG tablet  Commonly known as:  BENADRYL   1 tablet, Oral, Nightly PRN      omeprazole 20 MG capsule  Commonly known as:  priLOSEC   20 mg, Oral, Daily      ondansetron ODT 4 MG disintegrating tablet  Commonly known as:  ZOFRAN-ODT   DISSOLVE 1 TABLET IN MOUTH 3 TIMES A DAY AS NEEDED NAUSEA      PROBIOTIC-10 PO   Probiotic 10 billion cell capsule   1 tab PO daily for diarrhea      traZODone 100 MG tablet  Commonly known as:  DESYREL   100 mg, Oral, Nightly PRN         Stop These Medications    ibuprofen 600 MG tablet  Commonly known as:  ADVIL,MOTRIN            No Known Allergies      Discharge Disposition:  homeHome or Self Care    Diet:  Hospital:  Diet Order   Procedures   • Diet Regular; Cardiac       Activity: as tolerated  Activity Instructions     Activity as Tolerated            Restrictions or Other Recommendations:  Quit smoking.        CODE STATUS:    Code Status and Medical Interventions:   Ordered at: 11/18/19 2029     Level Of Support Discussed With:    Patient     Code Status:    CPR     Medical Interventions (Level of Support Prior to Arrest):    Full       Future Appointments   Date Time Provider Department Center   11/26/2019 10:15 AM Lonny Pugh DO [facility] None       Additional Instructions for the Follow-ups that You Need to Schedule     Discharge Follow-up with PCP   As directed       Currently Documented PCP:    Lonny Pugh DO    PCP Phone Number:    [phone]     Follow Up Details:  1 week         Discharge Follow-up with Specified Provider: Dr Flores; 6 Weeks   As directed      To:  Dr Flores    Follow Up:  6 Weeks         Discharge Follow-up with Specified Provider: [facility]; 1 Week   As directed      To:  [facility]     Follow Up:  1 Week               Time Spent on Discharge:  40 minutes    Electronically signed by Deneen Li MD, 11/20/19, 12:49 PM.

## 2019-11-20 NOTE — PROGRESS NOTES
" [facility] - Progress Note    Kb Walker  1955  S224/1    11/20/19, 9:14 AM      Chief Complaint: Following for Acute Systolic CHF    Subjective:   Was placed on fall precautions last night after standing up/leg cramps and unsteady.  Has had no issues with near falls today.    Went for stress test today.  Breathing is much better - actually feels back to baseline.  No chest pain, no dizziness.        Review of Systems:  Pertinent positives are listed above and in physical exam.  All others have been reviewed and are negative.      budesonide-formoterol 2 puff Inhalation BID - RT   carvedilol 6.25 mg Oral BID With Meals   enoxaparin 40 mg Subcutaneous Q24H   lactobacillus acidophilus 1 capsule Oral Daily   lisinopril 10 mg Oral Q24H   nicotine 1 patch Transdermal Q24H   pantoprazole 40 mg Oral QAM   pharmacy consult - MTM  Does not apply Daily   sodium chloride 10 mL Intravenous Q12H       Objective:  Vitals:   height is 175.3 cm (69.02\") and weight is 94.5 kg (208 lb 4.8 oz). His oral temperature is 98.1 °F (36.7 °C). His blood pressure is 120/92 and his pulse is 84. His respiration is 18 and oxygen saturation is 93%.     Intake/Output Summary (Last 24 hours) at 11/20/2019 0914  Last data filed at 11/20/2019 0250  Gross per 24 hour   Intake 360 ml   Output 2775 ml   Net -2415 ml       Physical Exam: -    CONST:  No weight loss, fever, + chills, + weakness + fatigue.    RESP:     + shortness of breath, no hemoptysis, + mild cough (much improved)  CARD:    No chest pain, palpitations, + PND.  Mild pre tibial edema BLE, R>L  NEURO:  No headache, dizziness, syncope, paralysis, ataxia, or parasthesias.                  No change in bowel or bladder control. No history of CVA/TIA        Results from last 7 days   Lab Units 11/19/19  0724   WBC 10*3/mm3 8.74   HEMOGLOBIN g/dL 16.0   HEMATOCRIT % 49.8   PLATELETS 10*3/mm3 176     Results from last 7 days   Lab Units 11/20/19  0706  " 11/18/19  1632   SODIUM mmol/L 142   < > 135*   POTASSIUM mmol/L 4.6   < > 5.0   CHLORIDE mmol/L 102   < > 95*   CO2 mmol/L 29.0   < > 27.0   BUN mg/dL 17   < > 18   CREATININE mg/dL 1.03   < > 1.03   CALCIUM mg/dL 8.7   < > 9.4   BILIRUBIN mg/dL  --   --  0.9   ALK PHOS U/L  --   --  68   ALT (SGPT) U/L  --   --  33   AST (SGOT) U/L  --   --  39   GLUCOSE mg/dL 117*   < > 144*    < > = values in this interval not displayed.         Results from last 7 days   Lab Units 11/19/19  0724   TSH uIU/mL 2.060     Results from last 7 days   Lab Units 11/19/19  0724   CHOLESTEROL mg/dL 148   TRIGLYCERIDES mg/dL 115   HDL CHOL mg/dL 36*   LDL CHOL mg/dL 89     Results from last 7 days   Lab Units 11/18/19  1632   PROBNP pg/mL 4,882.0*       Tele:  NSR/ST      Assessment and Plan:   1. Acute systolic congestive heart failure, new diagnosis:  - Patient presents to the ED11/18 with a 1 month history of dsypnea, cough, and fatigue, treated for pneumonia with no significant improvement in symptoms.   - Hypoxic upon arrival with proBNP 4,882 and CXR demonstrating increased pulmonary vascularity bilaterally, LE swelling  - Feels significantly improved after one time dose IV lasix.  Appeared fluid overloaded.    - Echocardiogram demonstrating EF 36-40%, moderate to severe MR, moderate TR  - Continue strict I/O's, daily weights     2.  Moderate to severe mitral valve regurgitation     3. Tobacco abuse, dependence:  -Advised immediate smoking cessation.  The risks to worsening lung and heart health was discussed with the patient if if he continues to smoke was discussed with the patient.  Different strategies of helping decrease smoking or stopping altogether were discussed with the patient.  He is interested and willing to trial a nicotine patch.  A total of 4 minutes was spent on this topic.     Plan:  1. Additional IV diuresis.  Given 40 mg IV lasix x 2 yesterday. Will hold off on further diuretic today as patient is feeling better.    2.  Repeat BMP today is WNL.  Strict I/Os, daily weights  2. Started Coreg 6.25 mg bid as well as Lisinopril 10 mg daily.  HR still in mid 90s - consider increasing dose prior to discharge.  3. Recommend an ischemic evaluation.  He is undergoing this today - will review later. If normal could potentially go home later if feeling better.  If abnormal, will proceed with LHC +/- PCI tomorrow.  I spent 30 minutes discussing echo results, labs, purpose of stress test.  Will allow him to eat.  4.  Patient acknowledges the importance of stopping smoking for his lungs and heart healthy.  He is willing to continue a nicotine patch, likely will continue at time of discharge.       I discussed the patients findings and my recommendations with the patient, any present family members, and the nursing staff.  Laurent Flores MD saw and examined patient, verified hx and PE, read all radiographic studies, reviewed labs and micro data, and formulated dx, plan for treatment and all medical decision making.      Jennifer Menard PA-C  11/20/19, 9:14 AM    Electronically signed by DAIJA Lopez, 11/20/19, 9:15 AM.

## 2019-11-20 NOTE — NURSING NOTE
This writer became concerned about pts fall status after he fell asleep while sitting on the side of the bed. He also was using the bedside table to brace himself while standing up or standing after being asked not to do the same due to it being likely to roll out from under him. At 2200 pt was made a fall risk.

## 2019-11-20 NOTE — PLAN OF CARE
Problem: Fall Risk (Adult)  Goal: Identify Related Risk Factors and Signs and Symptoms  Outcome: Ongoing (interventions implemented as appropriate)   11/20/19 8071   Fall Risk (Adult)   Related Risk Factors (Fall Risk) culprit medication(s);fatigue/slow reaction;gait/mobility problems;sensory deficits;environment unfamiliar   Signs and Symptoms (Fall Risk) presence of risk factors

## 2019-11-21 ENCOUNTER — READMISSION MANAGEMENT (OUTPATIENT)
Dept: CALL CENTER | Facility: HOSPITAL | Age: 64
End: 2019-11-21

## 2019-11-21 ENCOUNTER — TRANSITIONAL CARE MANAGEMENT TELEPHONE ENCOUNTER (OUTPATIENT)
Dept: FAMILY MEDICINE CLINIC | Facility: CLINIC | Age: 64
End: 2019-11-21

## 2019-11-21 NOTE — OUTREACH NOTE
Prep Survey      Responses   Facility patient discharged from?  [place]   Is patient eligible?  Yes   Discharge diagnosis  a/c resp failure, hyperglycemia, acute CHF   Does the patient have one of the following disease processes/diagnoses(primary or secondary)?  CHF   Does the patient have Home health ordered?  No   Is there a DME ordered?  No   Prep survey completed?  Yes          Cheryle Mo RN

## 2019-11-22 ENCOUNTER — READMISSION MANAGEMENT (OUTPATIENT)
Dept: CALL CENTER | Facility: HOSPITAL | Age: 64
End: 2019-11-22

## 2019-11-22 NOTE — OUTREACH NOTE
CHF Week 1 Survey      Responses   Facility patient discharged from?  [place]   Does the patient have one of the following disease processes/diagnoses(primary or secondary)?  CHF   Is there a successful TCM telephone encounter documented?  No   CHF Week 1 attempt successful?  No   Unsuccessful attempts  Attempt 1          Brianna Moncada RN

## 2019-11-25 ENCOUNTER — READMISSION MANAGEMENT (OUTPATIENT)
Dept: CALL CENTER | Facility: HOSPITAL | Age: 64
End: 2019-11-25

## 2019-11-25 NOTE — OUTREACH NOTE
CHF Week 1 Survey      Responses   Facility patient discharged from?  [place]   Does the patient have one of the following disease processes/diagnoses(primary or secondary)?  CHF   Is there a successful TCM telephone encounter documented?  No   CHF Week 1 attempt successful?  No   Unsuccessful attempts  Attempt 2          Khushbu Pugh RN

## 2019-11-26 ENCOUNTER — APPOINTMENT (OUTPATIENT)
Dept: LAB | Facility: HOSPITAL | Age: 64
End: 2019-11-26

## 2019-11-26 ENCOUNTER — OFFICE VISIT (OUTPATIENT)
Dept: FAMILY MEDICINE CLINIC | Facility: CLINIC | Age: 64
End: 2019-11-26

## 2019-11-26 VITALS
HEART RATE: 99 BPM | BODY MASS INDEX: 29.92 KG/M2 | WEIGHT: 202 LBS | DIASTOLIC BLOOD PRESSURE: 80 MMHG | OXYGEN SATURATION: 98 % | HEIGHT: 69 IN | SYSTOLIC BLOOD PRESSURE: 118 MMHG

## 2019-11-26 DIAGNOSIS — I50.9 ACUTE CONGESTIVE HEART FAILURE, UNSPECIFIED HEART FAILURE TYPE (HCC): Primary | ICD-10-CM

## 2019-11-26 DIAGNOSIS — Z87.891 PERSONAL HISTORY OF TOBACCO USE, PRESENTING HAZARDS TO HEALTH: ICD-10-CM

## 2019-11-26 DIAGNOSIS — R25.2 MUSCLE CRAMPS: ICD-10-CM

## 2019-11-26 DIAGNOSIS — G47.30 SLEEP APNEA, UNSPECIFIED TYPE: ICD-10-CM

## 2019-11-26 LAB
ANION GAP SERPL CALCULATED.3IONS-SCNC: 11.8 MMOL/L (ref 5–15)
BUN BLD-MCNC: 16 MG/DL (ref 8–23)
BUN/CREAT SERPL: 14 (ref 7–25)
CALCIUM SPEC-SCNC: 9.7 MG/DL (ref 8.6–10.5)
CHLORIDE SERPL-SCNC: 97 MMOL/L (ref 98–107)
CO2 SERPL-SCNC: 31.2 MMOL/L (ref 22–29)
CREAT BLD-MCNC: 1.14 MG/DL (ref 0.76–1.27)
GFR SERPL CREATININE-BSD FRML MDRD: 64 ML/MIN/1.73
GLUCOSE BLD-MCNC: 92 MG/DL (ref 65–99)
MAGNESIUM SERPL-MCNC: 2.3 MG/DL (ref 1.6–2.4)
POTASSIUM BLD-SCNC: 5.1 MMOL/L (ref 3.5–5.2)
SODIUM BLD-SCNC: 140 MMOL/L (ref 136–145)

## 2019-11-26 PROCEDURE — 99496 TRANSJ CARE MGMT HIGH F2F 7D: CPT | Performed by: FAMILY MEDICINE

## 2019-11-26 PROCEDURE — 36415 COLL VENOUS BLD VENIPUNCTURE: CPT | Performed by: FAMILY MEDICINE

## 2019-11-26 PROCEDURE — 83735 ASSAY OF MAGNESIUM: CPT | Performed by: FAMILY MEDICINE

## 2019-11-26 PROCEDURE — 80048 BASIC METABOLIC PNL TOTAL CA: CPT | Performed by: FAMILY MEDICINE

## 2019-11-26 RX ORDER — PNV NO.95/FERROUS FUM/FOLIC AC 28MG-0.8MG
250 TABLET ORAL DAILY
Qty: 90 TABLET | Refills: 3 | Status: SHIPPED | OUTPATIENT
Start: 2019-11-26 | End: 2020-11-05

## 2019-11-26 RX ORDER — POTASSIUM CHLORIDE 750 MG/1
10 TABLET, FILM COATED, EXTENDED RELEASE ORAL DAILY
Qty: 90 TABLET | Refills: 3 | Status: SHIPPED | OUTPATIENT
Start: 2019-11-26 | End: 2020-10-05 | Stop reason: SDUPTHER

## 2019-11-26 NOTE — PROGRESS NOTES
Transitional Care Follow Up Visit  Subjective     Kb Walker is a 64 y.o. male who presents for a transitional care management visit.    Within 48 business hours after discharge our office contacted him via telephone to coordinate his care and needs.      I reviewed and discussed the details of that call along with the discharge summary, hospital problems, inpatient lab results, inpatient diagnostic studies, and consultation reports with Kb.    Patient Care Team:  Lonny Pugh DO as PCP - General (Family Medicine)     Current outpatient and discharge medications have been reconciled for the patient.  Reviewed by: Lonny Pugh DO      Date of TCM Phone Call 11/21/2019   [facility]   Date of Admission 11/18/2019   Date of Discharge 11/20/2019   Discharge Disposition Home or Self Care     Risk for Readmission (LACE) Score: 7 (11/20/2019  6:00 AM)      History of Present Illness   Course During Hospital Stay:    Kb Walker is a 64 y.o. male, longterm smoker, who presented to [facility] after several weeks of progressive dyspnea.  He had been treated with antibiotics outpatient and had a chest CT which reportedly showed no infiltrate.  He presented to this office on 11/18 with persistent shortness of breath and was found to be hypoxic.  He was sent to the ER from clinic. On presentation, he had lower extremity edema, elevated BNP, and a CXR suggesting vascular congestion.  He had no prior diagnosis of CHF.  He was admitted for management.       With IV furosemide he diuresed well and felt much better.  An echo showed EF 35-40 with moderate to severe MV regurgitation.  He was seen by cardiologist Dr. Flores and had a PET stress test which showed EF 36% with normal perfusion.  Lisinopril, Lasix and carvedilol were started.     We discussed his tobacco habit and he will try to quit with the help of nicotine patches.          Discharge Follow Up Recommendations for  "labs/diagnostics:  Heart and valve clinic 1 week  Dr. Flores 6 weeks     Since discharge he is significantly improved. He is very grateful for the health care professionals involved for taking care of him. His breathing is significantly improved.  Some muscle cramps that seem to be related to the Lasix.  They did find a scale at home, he has been weighing himself every now and then.  He is drinking Gatorade to stay hydrated, and generally avoiding salt on his food.  He did start smoking a little bit again, which he attributes to anxiety.  He is planning to return to the patches.    The following portions of the patient's history were reviewed and updated as appropriate: allergies, current medications, past family history, past medical history, past social history, past surgical history and problem list.    Review of Systems   Constitutional: Negative.    Respiratory: Positive for apnea and shortness of breath. Negative for chest tightness.    Cardiovascular: Negative.    Psychiatric/Behavioral: Positive for sleep disturbance.       Objective   Blood pressure 118/80, pulse 99, height 175.3 cm (69.02\"), weight 91.6 kg (202 lb), SpO2 98 %.  Nursing note reviewed  Physical Exam  Const: NAD, A&Ox4, Pleasant, Cooperative  Eyes: EOMI, no conjunctivitis  ENT: No nasal discharge present, neck supple  Cardiac: Regular rate and rhythm, no cyanosis-no pedal edema  Resp: Respiratory rate within normal limits, no increased work of breathing, no audible wheezing or retractions noted.  The prominent crackles which were heard on exam previously, have resolved  Assessment/Plan     Kb was seen today for follow-up.    Diagnoses and all orders for this visit:    Acute congestive heart failure, unspecified heart failure type (CMS/HCC)  -     Basic Metabolic Panel; Future  -     Magnesium; Future  -     potassium chloride (K-DUR) 10 MEQ CR tablet; Take 1 tablet by mouth Daily. Take with Lasix to prevent muscle cramps  -     Magnesium " Oxide -Mg Supplement 250 MG tablet; Take 1 tablet by mouth Daily.  -     Basic Metabolic Panel  -     Magnesium    Personal history of tobacco use, presenting hazards to health  -     Full Pulmonary Function Test With Bronchodilator; Future    Muscle cramps  -     Basic Metabolic Panel; Future  -     Magnesium; Future  -     potassium chloride (K-DUR) 10 MEQ CR tablet; Take 1 tablet by mouth Daily. Take with Lasix to prevent muscle cramps  -     Magnesium Oxide -Mg Supplement 250 MG tablet; Take 1 tablet by mouth Daily.  -     Basic Metabolic Panel  -     Magnesium    Sleep apnea, unspecified type  -     Ambulatory Referral to Sleep Medicine        #1 new onset heart failure  He should continue lisinopril 10 mg, Lasix 40 mg daily, Coreg 6.25 mg twice daily with meals  We will check BMP and magnesium today.  Given his leg cramps, I did recommend he start a potassium and magnesium supplement on days he takes Lasix.  We reviewed conditions for daily weighing as well as dietary changes.  He will review these again with the cardiologist, may benefit from outpatient nutritionist referral.  Follow-up with the heart and valve clinic tomorrow  Follow-up with Dr. back in 6 weeks    #2 tobacco use  Kb Walker is a current cigarettes user.  He currently smokes 1 pack of cigarettes per day for a duration of 30+ years. I have educated him on the risk of diseases from using tobacco products such as cancer, COPD and heart diease.  He has newly diagnosed heart failure.  I advised him to quit and he is willing to quit. We have discussed the following method/s for tobacco cessation:  Education Material Counseling OTC Cessation Products.  He was prescribed nicotine patches in the hospital, and will restart them. He will follow up with me in 1 month or sooner to check on his progress.  I spent >10 minutes counseling the patient.  -I would recommend formal pulmonary function testing once his acute heart failure has resolved    #3  witnessed apnea  Patient has a significant history of nonrestorative sleep, as well as delayed sleep latency.  His wife also reports frequent apneas during the night, as well as gasping for air.  He does snore.  -Recommended sleep medicine referral    Patient Instructions   1.  Continue medications and supplements - as listed.  - Start taking potassium and magnesium supplements    2.  Continue well-balanced diet - low in calories and low in added sugar.  -Plant-based diets have the best evidence for decreasing inflammation and chronic pain, as well as reducing the risk of heart disease and dementia.    3.  Maintain a routine exercise program - 30 minutes at least 3 days every week.  This is important even if you are active at your job.    4.  A letter will be sent with your test results or they will be made available via [employer].  If you have not heard about your results within 10 days for chronic and routine labs, or 48 hours for acute labs, please call the office.    5.  You have been referred for specialized imaging and/or specialist office consultation today.  You will be contacted by our referral coordinator or the specialist's office to schedule your appointment over the next 2 to 3 business days.  Please make sure to check your voicemail, and ensure that we have the correct phone number on file for you.  Sometimes, based on insurance requirements, referrals may take longer to schedule. However if you have not heard from anyone within 7 days, please call the office to check on your referral status.  · Sleep medicine (for sleep apnea)  · Pulmonary function testing

## 2019-11-26 NOTE — OUTREACH NOTE
Multiple attempts have been made to contact pt to complete HSE call and confirm appt.  All attempts have been documented. The patient has a [facility] follow up scheduled with Dr. Pugh 11/26/19.

## 2019-11-26 NOTE — PATIENT INSTRUCTIONS
1.  Continue medications and supplements - as listed.  - Start taking potassium and magnesium supplements    2.  Continue well-balanced diet - low in calories and low in added sugar.  -Plant-based diets have the best evidence for decreasing inflammation and chronic pain, as well as reducing the risk of heart disease and dementia.    3.  Maintain a routine exercise program - 30 minutes at least 3 days every week.  This is important even if you are active at your job.    4.  A letter will be sent with your test results or they will be made available via [employer].  If you have not heard about your results within 10 days for chronic and routine labs, or 48 hours for acute labs, please call the office.    5.  You have been referred for specialized imaging and/or specialist office consultation today.  You will be contacted by our referral coordinator or the specialist's office to schedule your appointment over the next 2 to 3 business days.  Please make sure to check your voicemail, and ensure that we have the correct phone number on file for you.  Sometimes, based on insurance requirements, referrals may take longer to schedule. However if you have not heard from anyone within 7 days, please call the office to check on your referral status.  · Sleep medicine (for sleep apnea)  · Pulmonary function testing

## 2019-11-27 ENCOUNTER — READMISSION MANAGEMENT (OUTPATIENT)
Dept: CALL CENTER | Facility: HOSPITAL | Age: 64
End: 2019-11-27

## 2019-11-27 ENCOUNTER — OFFICE VISIT (OUTPATIENT)
Dept: CARDIOLOGY | Facility: HOSPITAL | Age: 64
End: 2019-11-27

## 2019-11-27 VITALS
RESPIRATION RATE: 18 BRPM | HEIGHT: 69 IN | TEMPERATURE: 96.9 F | WEIGHT: 203.13 LBS | SYSTOLIC BLOOD PRESSURE: 123 MMHG | OXYGEN SATURATION: 96 % | DIASTOLIC BLOOD PRESSURE: 79 MMHG | HEART RATE: 92 BPM | BODY MASS INDEX: 30.09 KG/M2

## 2019-11-27 DIAGNOSIS — I50.22 CHRONIC SYSTOLIC HEART FAILURE (HCC): Primary | ICD-10-CM

## 2019-11-27 DIAGNOSIS — I25.10 CORONARY ARTERY DISEASE INVOLVING NATIVE CORONARY ARTERY OF NATIVE HEART WITHOUT ANGINA PECTORIS: ICD-10-CM

## 2019-11-27 DIAGNOSIS — Z72.0 TOBACCO USE: ICD-10-CM

## 2019-11-27 PROCEDURE — 99214 OFFICE O/P EST MOD 30 MIN: CPT | Performed by: NURSE PRACTITIONER

## 2019-11-27 NOTE — OUTREACH NOTE
CHF Week 2 Survey      Responses   Facility patient discharged from?  [facility]   Does the patient have one of the following disease processes/diagnoses(primary or secondary)?  CHF   Week 2 attempt successful?  Yes   Call start time  1252   Rescheduled  Rescheduled-pt requested   Call end time  1252   Discharge diagnosis  a/c resp failure, hyperglycemia, acute CHF   Is patient permission given to speak with other caregiver?  Yes   List who call center can speak with  Lety, Wife          Sabra Chaney RN

## 2019-11-29 ENCOUNTER — READMISSION MANAGEMENT (OUTPATIENT)
Dept: CALL CENTER | Facility: HOSPITAL | Age: 64
End: 2019-11-29

## 2019-11-29 NOTE — OUTREACH NOTE
CHF Week 2 Survey      Responses   Facility patient discharged from?  [facility]   Does the patient have one of the following disease processes/diagnoses(primary or secondary)?  CHF   Week 2 attempt successful?  Yes   Call start time  0924   Call end time  0927   Discharge diagnosis  a/c resp failure, hyperglycemia, acute CHF   Is patient permission given to speak with other caregiver?  Yes   List who call center can speak with  Lety, Wife   Person spoke with today (if not patient) and relationship  Lety, wife   Meds reviewed with patient/caregiver?  Yes   Is the patient taking all medications as directed (includes completed medication regime)?  Yes   Medication comments  Wife states potassium and magnesium was added to patients meds with dr deshpande this week.    Does the patient have a primary care provider?   Yes   Comments regarding PCP  Lonny Pugh, DO PCP   Has the patient kept scheduled appointments due by today?  Yes   Has home health visited the patient within 72 hours of discharge?  N/A   Psychosocial issues?  No   Did the patient receive a copy of their discharge instructions?  Yes   Nursing interventions  Reviewed instructions with patient   What is the patient's perception of their health status since discharge?  Improving   Is the patient weighing daily?  Yes   Does the patient have scales?  Yes   Daily weight interventions  Education provided on importance of daily weight   Is the patient able to teach back signs and symptoms of worsening condition? (i.e. weight gain, shortness of air, etc.)  Yes   Is the patient/caregiver able to teach back the hierarchy of who to call/visit for symptoms/problems? PCP, Specialist, Home health nurse, Urgent Care, ED, 911  Yes   CHF Week 2 call completed?  Yes          Brianna Moncada RN

## 2019-12-02 ENCOUNTER — OFFICE VISIT (OUTPATIENT)
Dept: PULMONOLOGY | Facility: CLINIC | Age: 64
End: 2019-12-02

## 2019-12-02 DIAGNOSIS — Z87.891 PERSONAL HISTORY OF TOBACCO USE, PRESENTING HAZARDS TO HEALTH: ICD-10-CM

## 2019-12-02 PROCEDURE — 94729 DIFFUSING CAPACITY: CPT | Performed by: INTERNAL MEDICINE

## 2019-12-02 PROCEDURE — 94726 PLETHYSMOGRAPHY LUNG VOLUMES: CPT | Performed by: INTERNAL MEDICINE

## 2019-12-02 PROCEDURE — 94060 EVALUATION OF WHEEZING: CPT | Performed by: INTERNAL MEDICINE

## 2019-12-02 RX ORDER — ALBUTEROL SULFATE 90 UG/1
4 AEROSOL, METERED RESPIRATORY (INHALATION) ONCE
Status: COMPLETED | OUTPATIENT
Start: 2019-12-02 | End: 2019-12-02

## 2019-12-02 RX ADMIN — ALBUTEROL SULFATE 4 PUFF: 90 AEROSOL, METERED RESPIRATORY (INHALATION) at 14:41

## 2019-12-02 NOTE — PROGRESS NOTES
[facility]      Encounter Date:11/27/2019     Kb Walker  [address] [place] [address]  [unfilled]    1955    Lonny Pugh DO    bK Walker is a 64 y.o. male.      Subjective:     Chief Complaint:  Congestive Heart Failure and Establish Care       · HPI 64 year old male presents to the office today for ongoing evaluation of his newly diagnosed SHF. He was admitted to [facility] 11/18/19-11/20/19 with acute on chronic systolic heart failure (new diagnosis).Echo during hospitalization showed an EF of 36-40%. PET showed  normal myocardial perfusion study with no evidence of ischemia. There was coronary artery calcification noted in the territory of the LAD. Patient reports that he is feeling significantly better since discharge from the hospital. He notes a 10 lb weight loss. Denies cp, dyspnea, pedal edema, palpitations,syncope, orthopnea.       Patient Active Problem List   Diagnosis   • Acute respiratory failure with hypoxia (CMS/HCC)   • Personal history of tobacco use, presenting hazards to health   • Hyperglycemia   • Acute CHF (CMS/HCC)       Past Medical History:   Diagnosis Date   • Arthritis    • Gall stones        History reviewed. No pertinent surgical history.    Family History   Problem Relation Age of Onset   • Arthritis Mother    • Arthritis Father    • Hypertension Father    • Heart failure Father    • Coronary artery disease Brother        Social History     Socioeconomic History   • Marital status:      Spouse name: Not on file   • Number of children: Not on file   • Years of education: Not on file   • Highest education level: Not on file   Tobacco Use   • Smoking status: Current Every Day Smoker     Packs/day: 1.00     Years: 50.00     Pack years: 50.00     Types: Cigarettes   • Smokeless tobacco: Never Used   • Tobacco comment: placed the patch 11/27/2019   Substance and Sexual Activity   • Alcohol use: Yes     Frequency: Never      Comment: occasional   • Drug use: No   • Sexual activity: Defer   Social History Narrative    caffeine use: 2-3 serving of coffee a day om average       No Known Allergies      Current Outpatient Medications:   •  acetaminophen (TYLENOL) 325 MG tablet, Take 650 mg by mouth Every 6 (Six) Hours As Needed for Mild Pain ., Disp: , Rfl:   •  albuterol sulfate  (90 Base) MCG/ACT inhaler, Inhale 2 puffs Every 4 (Four) Hours As Needed., Disp: , Rfl:   •  budesonide-formoterol (SYMBICORT) 160-4.5 MCG/ACT inhaler, Inhale 2 puffs 2 (Two) Times a Day., Disp: , Rfl:   •  carvedilol (COREG) 6.25 MG tablet, Take 1 tablet by mouth 2 (Two) Times a Day With Meals., Disp: 60 tablet, Rfl: 11  •  diphenhydrAMINE (BENADRYL) 50 MG tablet, Take 1 tablet by mouth At Night As Needed., Disp: , Rfl:   •  furosemide (LASIX) 40 MG tablet, Take 1 tablet by mouth Daily., Disp: 30 tablet, Rfl: 11  •  lisinopril (PRINIVIL,ZESTRIL) 10 MG tablet, Take 1 tablet by mouth Daily., Disp: 30 tablet, Rfl: 11  •  Magnesium Oxide -Mg Supplement 250 MG tablet, Take 1 tablet by mouth Daily., Disp: 90 tablet, Rfl: 3  •  Nicotine 21-14-7 MG/24HR kit, Place 1 patch on the skin as directed by provider Daily. Take as directed, Disp: 1 each, Rfl: 0  •  ondansetron ODT (ZOFRAN-ODT) 4 MG disintegrating tablet, DISSOLVE 1 TABLET IN MOUTH 3 TIMES A DAY AS NEEDED NAUSEA, Disp: , Rfl: 0  •  potassium chloride (K-DUR) 10 MEQ CR tablet, Take 1 tablet by mouth Daily. Take with Lasix to prevent muscle cramps, Disp: 90 tablet, Rfl: 3  •  Probiotic Product (PROBIOTIC-10 PO), Probiotic 10 billion cell capsule  1 tab PO daily for diarrhea, Disp: , Rfl:   •  traZODone (DESYREL) 100 MG tablet, Take 100 mg by mouth At Night As Needed., Disp: , Rfl:   •  omeprazole (priLOSEC) 20 MG capsule, Take 20 mg by mouth Daily., Disp: , Rfl:     The following portions of the patient's history were reviewed today and updated as appropriate: allergies, current medications, past family  "history, past medical history, past social history, past surgical history and problem list     Review of Systems   Constitution: Negative for chills, decreased appetite, diaphoresis, fever, weakness, malaise/fatigue, night sweats, weight gain and weight loss.   HENT: Negative for congestion, hearing loss, hoarse voice and nosebleeds.    Eyes: Negative for blurred vision, visual disturbance and visual halos.   Cardiovascular: Negative for chest pain, claudication, cyanosis, dyspnea on exertion, irregular heartbeat, leg swelling, near-syncope, orthopnea, palpitations, paroxysmal nocturnal dyspnea and syncope.   Respiratory: Negative for cough, hemoptysis, shortness of breath, sleep disturbances due to breathing, snoring, sputum production and wheezing.    Hematologic/Lymphatic: Negative for bleeding problem. Does not bruise/bleed easily.   Skin: Negative for dry skin, itching and rash.   Musculoskeletal: Negative for arthritis, falls, joint pain, joint swelling and myalgias.   Gastrointestinal: Negative for bloating, abdominal pain, constipation, diarrhea, flatus, heartburn, hematemesis, hematochezia, melena, nausea and vomiting.   Genitourinary: Negative for dysuria, frequency, hematuria, nocturia and urgency.   Neurological: Negative for excessive daytime sleepiness, dizziness, headaches, light-headedness and loss of balance.   Psychiatric/Behavioral: Negative for depression. The patient has insomnia. The patient is not nervous/anxious.        Objective:     Vitals:    11/27/19 1408 11/27/19 1411 11/27/19 1412   BP: 124/78 121/79 123/79   BP Location: Right arm Left arm Left arm   Patient Position: Sitting Sitting Standing   Cuff Size: Adult Adult Adult   Pulse: 91  92   Resp: 18     Temp: 96.9 °F (36.1 °C)     TempSrc: Temporal     SpO2: 97%  96%   Weight: 92.1 kg (203 lb 2 oz)     Height: 175.3 cm (69\")       Body mass index is 30 kg/m².  Physical Exam   Constitutional: He is oriented to person, place, and time. " He appears well-developed and well-nourished. He is active and cooperative. No distress.   HENT:   Head: Normocephalic and atraumatic.   Mouth/Throat: Oropharynx is clear and moist.   Eyes: Conjunctivae and EOM are normal. Pupils are equal, round, and reactive to light.   Neck: Normal range of motion. Neck supple. No JVD present. No tracheal deviation present. No thyromegaly present.   Cardiovascular: Normal rate, regular rhythm, normal heart sounds and intact distal pulses.   Pulmonary/Chest: Effort normal and breath sounds normal.   Abdominal: Soft. Bowel sounds are normal. He exhibits no distension. There is no tenderness.   Musculoskeletal: Normal range of motion.   Neurological: He is alert and oriented to person, place, and time.   Skin: Skin is warm, dry and intact.   Psychiatric: He has a normal mood and affect. His behavior is normal.   Nursing note and vitals reviewed.      Lab and Diagnostic Review:  Results for orders placed or performed in visit on 11/26/19   Basic Metabolic Panel   Result Value Ref Range    Glucose 92 65 - 99 mg/dL    BUN 16 8 - 23 mg/dL    Creatinine 1.14 0.76 - 1.27 mg/dL    Sodium 140 136 - 145 mmol/L    Potassium 5.1 3.5 - 5.2 mmol/L    Chloride 97 (L) 98 - 107 mmol/L    CO2 31.2 (H) 22.0 - 29.0 mmol/L    Calcium 9.7 8.6 - 10.5 mg/dL    eGFR Non African Amer 64 >60 mL/min/1.73    BUN/Creatinine Ratio 14.0 7.0 - 25.0    Anion Gap 11.8 5.0 - 15.0 mmol/L   Magnesium   Result Value Ref Range    Magnesium 2.3 1.6 - 2.4 mg/dL     · Echo: Left ventricular systolic function is moderately decreased. Estimated EF appears to be in the range of 36 - 40%.  · There is left ventricular global hypokinesis noted.  · Left ventricular diastolic dysfunction (grade III) consistent with reversible restrictive pattern.  · Right ventricular cavity is borderline dilated.  · Left atrial cavity size is severely dilated.  · Right atrial cavity size is moderately dilated.  · Moderate-to-severe mitral valve  regurgitation is present  · Mild tricuspid valve regurgitation is present.  · Estimated right ventricular systolic pressure from tricuspid regurgitation is mildly elevated (35-45 mmHg).  ·  Left ventricular ejection fraction is moderately reduced (Calculated EF = 36%). The left ventricle was moderately dilated and had diffuse hypokinesis.    Pet:   · Myocardial perfusion imaging indicates a normal myocardial perfusion study with no evidence of ischemia.  · There was coronary artery calcification noted in the territory of the LAD.  · Impressions are consistent with an intermediate risk study.           Assessment and Plan:   1. Chronic systolic heart failure (CMS/HCC)  euvolemic  Heart failure education today including signs and symptoms, the role of the heart failure center, daily weights, low sodium diet (less than 1500 mg per day), and daily physical activity. Reviewed HF Zones with patient and family.  Patient to continue current medications as previously ordered.     2. Coronary artery disease involving native coronary artery of native heart without angina pectoris  Without angina  Normal pet, calcification in LAD    3. Tobacco use  It is very important that he quit smoking. There are various alternatives available to help with this difficult task, but first and foremost, he must make a firm commitment and decision to quit. The nature of nicotine addiction is discussed. The correct use, cost and side effects of nicotine replacement therapy such as gum or patches was discussed. Bupropion and its cost (sometimes not covered fully by insurance) and side effects are reviewed.  I recommend he not allow potential costs of treatment to deter him from using nicotine replacement therapy or bupropion, as the long term economic and health benefits are obvious.education time 1.5 minutes          It has been a pleasure to participate in the care of this patient.  Patient was instructed to call the [facility] with  any questions, concerns, or worsening symptoms.    *Please note that portions of this note were completed with a voice recognition program. Efforts were made to edit the dictations, but occasionally words are mistranscribed.

## 2019-12-09 ENCOUNTER — READMISSION MANAGEMENT (OUTPATIENT)
Dept: CALL CENTER | Facility: HOSPITAL | Age: 64
End: 2019-12-09

## 2019-12-09 NOTE — OUTREACH NOTE
CHF Week 3 Survey      Responses   Facility patient discharged from?  [place]   Does the patient have one of the following disease processes/diagnoses(primary or secondary)?  CHF   Week 3 attempt successful?  Yes   Call start time  1315   Call end time  1319   Person spoke with today (if not patient) and relationship  Lety Leblanc reviewed with patient/caregiver?  Yes   Is the patient having any side effects they believe may be caused by any medication additions or changes?  No   Does the patient have all medications ordered at discharge?  Yes   Is the patient taking all medications as directed (includes completed medication regime)?  Yes   Does the patient have a primary care provider?   Yes   Does the patient have an appointment with their PCP within 7 days of discharge?  Yes   Has the patient kept scheduled appointments due by today?  Yes   Has home health visited the patient within 72 hours of discharge?  N/A   Psychosocial issues?  No   Did the patient receive a copy of their discharge instructions?  Yes   Nursing interventions  Reviewed instructions with patient   What is the patient's perception of their health status since discharge?  Improving   Nursing interventions  Nurse provided patient education   Is the patient weighing daily?  Yes   Does the patient have scales?  Yes   Daily weight interventions  Education provided on importance of daily weight   Is the patient able to teach back Heart Failure diet management?  Yes   Is the patient able to teach back Heart Failure Zones?  Yes   Is the patient able to teach back signs and symptoms of worsening condition? (i.e. weight gain, shortness of air, etc.)  Yes   If the patient is a current smoker, are they able to teach back resources for cessation?  -- [using NRT]   Is the patient/caregiver able to teach back the hierarchy of who to call/visit for symptoms/problems? PCP, Specialist, Home health nurse, Urgent Care, ED, 911  Yes   CHF Week 3 call  completed?  Yes   Revoked  No further contact(revokes)-requires comment   Graduated/Revoked comments  Wife states is doing well-denies any edema or SOA. States will call if any questions/concerns.          Cherri Greene RN

## 2019-12-24 ENCOUNTER — OFFICE VISIT (OUTPATIENT)
Dept: FAMILY MEDICINE CLINIC | Facility: CLINIC | Age: 64
End: 2019-12-24

## 2019-12-24 VITALS
DIASTOLIC BLOOD PRESSURE: 80 MMHG | HEIGHT: 69 IN | BODY MASS INDEX: 30.07 KG/M2 | HEART RATE: 98 BPM | SYSTOLIC BLOOD PRESSURE: 118 MMHG | OXYGEN SATURATION: 98 % | WEIGHT: 203 LBS

## 2019-12-24 DIAGNOSIS — I50.9 ACUTE CONGESTIVE HEART FAILURE, UNSPECIFIED HEART FAILURE TYPE (HCC): Primary | ICD-10-CM

## 2019-12-24 DIAGNOSIS — J44.9 STAGE 2 MODERATE COPD BY GOLD CLASSIFICATION (HCC): ICD-10-CM

## 2019-12-24 PROCEDURE — 99213 OFFICE O/P EST LOW 20 MIN: CPT | Performed by: FAMILY MEDICINE

## 2019-12-24 NOTE — PATIENT INSTRUCTIONS
Chronic Obstructive Pulmonary Disease    Chronic obstructive pulmonary disease (COPD) is a long-term (chronic) condition that affects the lungs. COPD is a general term that can be used to describe many different lung problems that cause lung swelling (inflammation) and limit airflow, including chronic bronchitis and emphysema. If you have COPD, your lung function will probably never return to normal. In most cases, it gets worse over time. However, there are steps you can take to slow the progression of the disease and improve your quality of life.  What are the causes?  This condition may be caused by:  · Smoking. This is the most common cause.  · Certain genes passed down through families.  What increases the risk?  The following factors may make you more likely to develop this condition:  · Secondhand smoke from cigarettes, pipes, or cigars.  · Exposure to chemicals and other irritants such as fumes and dust in the work environment.  · Chronic lung conditions or infections.  What are the signs or symptoms?  Symptoms of this condition include:  · Shortness of breath, especially during physical activity.  · Chronic cough with a large amount of thick mucus. Sometimes the cough may not have any mucus (dry cough).  · Wheezing.  · Rapid breaths.  · Gray or bluish discoloration (cyanosis) of the skin, especially in your fingers, toes, or lips.  · Feeling tired (fatigue).  · Weight loss.  · Chest tightness.  · Frequent infections.  · Episodes when breathing symptoms become much worse (exacerbations).  · Swelling in the ankles, feet, or legs. This may occur in later stages of the disease.  How is this diagnosed?  This condition is diagnosed based on:  · Your medical history.  · A physical exam.  You may also have tests, including:  · Lung (pulmonary) function tests. This may include a spirometry test, which measures your ability to exhale properly.  · Chest X-ray.  · CT scan.  · Blood tests.  How is this treated?  This  condition may be treated with:  · Medicines. These may include inhaled rescue medicines to treat acute exacerbations as well as long-term, or maintenance, medicines to prevent flare-ups of COPD.  ? Bronchodilators help treat COPD by dilating the airways to allow increased airflow and make your breathing more comfortable.  ? Steroids can reduce airway inflammation and help prevent exacerbations.  · Smoking cessation. If you smoke, your health care provider may ask you to quit, and may also recommend therapy or replacement products to help you quit.  · Pulmonary rehabilitation. This may involve working with a team of health care providers and specialists, such as respiratory, occupational, and physical therapists.  · Exercise and physical activity. These are beneficial for nearly all people with COPD.  · Nutrition therapy to gain weight, if you are underweight.  · Oxygen. Supplemental oxygen therapy is only helpful if you have a low oxygen level in your blood (hypoxemia).  · Lung surgery or transplant.  · Palliative care. This is to help people with COPD feel comfortable when treatment is no longer working.  Follow these instructions at home:  Medicines  · Take over-the-counter and prescription medicines (inhaled or pills) only as told by your health care provider.  · Talk to your health care provider before taking any cough or allergy medicines. You may need to avoid certain medicines that dry out your airways.  Lifestyle  · If you are a smoker, the most important thing that you can do is to stop smoking. Do not use any products that contain nicotine or tobacco, such as cigarettes and e-cigarettes. If you need help quitting, ask your health care provider. Continuing to smoke will cause the disease to progress faster.  · Avoid exposure to things that irritate your lungs, such as smoke, chemicals, and fumes.  · Stay active, but balance activity with periods of rest. Exercise and physical activity will help you maintain  your ability to do things you want to do.  · Learn and use relaxation techniques to manage stress and to control your breathing.  · Get the right amount of sleep and get quality sleep. Most adults need 7 or more hours per night.  · Eat healthy foods. Eating smaller, more frequent meals and resting before meals may help you maintain your strength.  Controlled breathing  Learn and use controlled breathing techniques as directed by your health care provider. Controlled breathing techniques include:  · Pursed lip breathing. Start by breathing in (inhaling) through your nose for 1 second. Then, purse your lips as if you were going to whistle and breathe out (exhale) through the pursed lips for 2 seconds.  · Diaphragmatic breathing. Start by putting one hand on your abdomen just above your waist. Inhale slowly through your nose. The hand on your abdomen should move out. Then purse your lips and exhale slowly. You should be able to feel the hand on your abdomen moving in as you exhale.  Controlled coughing  Learn and use controlled coughing to clear mucus from your lungs. Controlled coughing is a series of short, progressive coughs. The steps of controlled coughing are:  1. Lean your head slightly forward.  2. Breathe in deeply using diaphragmatic breathing.  3. Try to hold your breath for 3 seconds.  4. Keep your mouth slightly open while coughing twice.  5. Spit any mucus out into a tissue.  6. Rest and repeat the steps once or twice as needed.  General instructions  · Make sure you receive all the vaccines that your health care provider recommends, especially the pneumococcal and influenza vaccines. Preventing infection and hospitalization is very important when you have COPD.  · Use oxygen therapy and pulmonary rehabilitation if directed to by your health care provider. If you require home oxygen therapy, ask your health care provider whether you should purchase a pulse oximeter to measure your oxygen level at  home.  · Work with your health care provider to develop a COPD action plan. This will help you know what steps to take if your condition gets worse.  · Keep other chronic health conditions under control as told by your health care provider.  · Avoid extreme temperature and humidity changes.  · Avoid contact with people who have an illness that spreads from person to person (is contagious), such as viral infections or pneumonia.  · Keep all follow-up visits as told by your health care provider. This is important.  Contact a health care provider if:  · You are coughing up more mucus than usual.  · There is a change in the color or thickness of your mucus.  · Your breathing is more labored than usual.  · Your breathing is faster than usual.  · You have difficulty sleeping.  · You need to use your rescue medicines or inhalers more often than expected.  · You have trouble doing routine activities such as getting dressed or walking around the house.  Get help right away if:  · You have shortness of breath while you are resting.  · You have shortness of breath that prevents you from:  ? Being able to talk.  ? Performing your usual physical activities.  · You have chest pain lasting longer than 5 minutes.  · Your skin color is more blue (cyanotic) than usual.  · You measure low oxygen saturations for longer than 5 minutes with a pulse oximeter.  · You have a fever.  · You feel too tired to breathe normally.  Summary  · Chronic obstructive pulmonary disease (COPD) is a long-term (chronic) condition that affects the lungs.  · Your lung function will probably never return to normal. In most cases, it gets worse over time. However, there are steps you can take to slow the progression of the disease and improve your quality of life.  · Treatment for COPD may include taking medicines, quitting smoking, pulmonary rehabilitation, and changes to diet and exercise. As the disease progresses, you may need oxygen therapy, a lung  transplant, or palliative care.  · To help manage your condition, do not smoke, avoid exposure to things that irritate your lungs, stay up to date on all vaccines, and follow your health care provider's instructions for taking medicines.  This information is not intended to replace advice given to you by your health care provider. Make sure you discuss any questions you have with your health care provider.  Document Released: 09/27/2006 Document Revised: 06/13/2018 Document Reviewed: 01/22/2018  [employer] Patient Education © 2019 [employer]

## 2019-12-24 NOTE — PROGRESS NOTES
Subjective   Kb Walker is a 64 y.o. male.     Chief Complaint   Patient presents with   • Follow-up       History of Present Illness     Kb Walker presents today for   Chief Complaint   Patient presents with   • Follow-up     He is here today to follow-up on new onset heart failure.  He was last seen here in the office on 11/26/2019 for a hospital follow-up visit.  He was hospitalized in November from 11/18 to 11/20 with acute on chronic systolic heart failure.  An echo during his hospitalization showed an ejection fraction of 36 to 40%.  That showed normal myocardial perfusion with no evidence of ischemia.  There was coronary artery calcification noted in the LAD.  Patient has been feeling substantially improved over the last.  He has lost over 15 pounds in fluid.  He denies chest pain, dyspnea, pedal edema rotations or orthopnea.  He is doing extremely well today and has no acute complaints.  He had pulmonary function testing completed at the beginning of December, but canceled his follow-up appointment with pulmonology.  PFTs were consistent with stage II/moderate COPD.    This patient is accompanied by their wife who contributes to the history of their care.    The following portions of the patient's history were reviewed and updated as appropriate: allergies, current medications, past family history, past medical history, past social history, past surgical history and problem list.    Active Ambulatory Problems     Diagnosis Date Noted   • Acute respiratory failure with hypoxia (CMS/HCC) 11/18/2019   • Personal history of tobacco use, presenting hazards to health 11/18/2019   • Hyperglycemia 11/18/2019   • Acute CHF (CMS/HCC) 11/18/2019     Resolved Ambulatory Problems     Diagnosis Date Noted   • No Resolved Ambulatory Problems     Past Medical History:   Diagnosis Date   • Arthritis    • Gall stones      History reviewed. No pertinent surgical history.  Family History   Problem Relation Age of Onset  "  • Arthritis Mother    • Arthritis Father    • Hypertension Father    • Heart failure Father    • Coronary artery disease Brother      Social History     Socioeconomic History   • Marital status:      Spouse name: Not on file   • Number of children: Not on file   • Years of education: Not on file   • Highest education level: Not on file   Tobacco Use   • Smoking status: Current Every Day Smoker     Packs/day: 1.00     Years: 50.00     Pack years: 50.00     Types: Cigarettes   • Smokeless tobacco: Never Used   • Tobacco comment: placed the patch 11/27/2019   Substance and Sexual Activity   • Alcohol use: Yes     Frequency: Never     Comment: occasional   • Drug use: No   • Sexual activity: Defer   Social History Narrative    caffeine use: 2-3 serving of coffee a day om average       Review of Systems  Review of Systems -  General ROS: negative for - chills, fever or night sweats  Cardiovascular ROS: no chest pain or dyspnea on exertion  Gastrointestinal ROS: no abdominal pain, change in bowel habits, or black or bloody stools  Genito-Urinary ROS: no dysuria, trouble voiding, or hematuria    Objective   Blood pressure 118/80, pulse 98, height 175.3 cm (69.02\"), weight 92.1 kg (203 lb), SpO2 98 %.  Nursing note reviewed  Physical Exam  Const: NAD, A&Ox4, Pleasant, Cooperative  Eyes: EOMI, no conjunctivitis  ENT: No nasal discharge present, neck supple  Cardiac: Regular rate and rhythm, no cyanosis  Resp: Respiratory rate within normal limits, no increased work of breathing, no audible wheezing or retractions noted  GI: No distention or ascites  MSK: Motor and sensation grossly intact in bilateral upper extremities  Neurologic: CN II-XII grossly intact  Psych: Appropriate mood and behavior.  Skin: Warm, dry  Procedures  Assessment/Plan   Problem List Items Addressed This Visit        Cardiovascular and Mediastinum    Acute CHF (CMS/HCC) - Primary    Overview     · Left ventricular ejection fraction is moderately " reduced (Calculated EF = 36%). The left ventricle was moderately dilated and had diffuse hypokinesis.  · Myocardial perfusion imaging indicates a normal myocardial perfusion study with no evidence of ischemia.  · There was coronary artery calcification noted in the territory of the LAD.  · Impressions are consistent with an intermediate risk study.           Other Visit Diagnoses     Stage 2 moderate COPD by GOLD classification (CMS/[facility])        Relevant Medications    Umeclidinium Bromide (INCRUSE ELLIPTA) 62.5 MCG/INH aerosol powder           See patient diagnoses and orders along with patient instructions for assessment, plan, and changes to care for patient.    Patient Instructions   Chronic Obstructive Pulmonary Disease    Chronic obstructive pulmonary disease (COPD) is a long-term (chronic) condition that affects the lungs. COPD is a general term that can be used to describe many different lung problems that cause lung swelling (inflammation) and limit airflow, including chronic bronchitis and emphysema. If you have COPD, your lung function will probably never return to normal. In most cases, it gets worse over time. However, there are steps you can take to slow the progression of the disease and improve your quality of life.  What are the causes?  This condition may be caused by:  · Smoking. This is the most common cause.  · Certain genes passed down through families.  What increases the risk?  The following factors may make you more likely to develop this condition:  · Secondhand smoke from cigarettes, pipes, or cigars.  · Exposure to chemicals and other irritants such as fumes and dust in the work environment.  · Chronic lung conditions or infections.  What are the signs or symptoms?  Symptoms of this condition include:  · Shortness of breath, especially during physical activity.  · Chronic cough with a large amount of thick mucus. Sometimes the cough may not have any mucus (dry cough).  · Wheezing.  · Rapid  breaths.  · Gray or bluish discoloration (cyanosis) of the skin, especially in your fingers, toes, or lips.  · Feeling tired (fatigue).  · Weight loss.  · Chest tightness.  · Frequent infections.  · Episodes when breathing symptoms become much worse (exacerbations).  · Swelling in the ankles, feet, or legs. This may occur in later stages of the disease.  How is this diagnosed?  This condition is diagnosed based on:  · Your medical history.  · A physical exam.  You may also have tests, including:  · Lung (pulmonary) function tests. This may include a spirometry test, which measures your ability to exhale properly.  · Chest X-ray.  · CT scan.  · Blood tests.  How is this treated?  This condition may be treated with:  · Medicines. These may include inhaled rescue medicines to treat acute exacerbations as well as long-term, or maintenance, medicines to prevent flare-ups of COPD.  ? Bronchodilators help treat COPD by dilating the airways to allow increased airflow and make your breathing more comfortable.  ? Steroids can reduce airway inflammation and help prevent exacerbations.  · Smoking cessation. If you smoke, your health care provider may ask you to quit, and may also recommend therapy or replacement products to help you quit.  · Pulmonary rehabilitation. This may involve working with a team of health care providers and specialists, such as respiratory, occupational, and physical therapists.  · Exercise and physical activity. These are beneficial for nearly all people with COPD.  · Nutrition therapy to gain weight, if you are underweight.  · Oxygen. Supplemental oxygen therapy is only helpful if you have a low oxygen level in your blood (hypoxemia).  · Lung surgery or transplant.  · Palliative care. This is to help people with COPD feel comfortable when treatment is no longer working.  Follow these instructions at home:  Medicines  · Take over-the-counter and prescription medicines (inhaled or pills) only as told by  your health care provider.  · Talk to your health care provider before taking any cough or allergy medicines. You may need to avoid certain medicines that dry out your airways.  Lifestyle  · If you are a smoker, the most important thing that you can do is to stop smoking. Do not use any products that contain nicotine or tobacco, such as cigarettes and e-cigarettes. If you need help quitting, ask your health care provider. Continuing to smoke will cause the disease to progress faster.  · Avoid exposure to things that irritate your lungs, such as smoke, chemicals, and fumes.  · Stay active, but balance activity with periods of rest. Exercise and physical activity will help you maintain your ability to do things you want to do.  · Learn and use relaxation techniques to manage stress and to control your breathing.  · Get the right amount of sleep and get quality sleep. Most adults need 7 or more hours per night.  · Eat healthy foods. Eating smaller, more frequent meals and resting before meals may help you maintain your strength.  Controlled breathing  Learn and use controlled breathing techniques as directed by your health care provider. Controlled breathing techniques include:  · Pursed lip breathing. Start by breathing in (inhaling) through your nose for 1 second. Then, purse your lips as if you were going to whistle and breathe out (exhale) through the pursed lips for 2 seconds.  · Diaphragmatic breathing. Start by putting one hand on your abdomen just above your waist. Inhale slowly through your nose. The hand on your abdomen should move out. Then purse your lips and exhale slowly. You should be able to feel the hand on your abdomen moving in as you exhale.  Controlled coughing  Learn and use controlled coughing to clear mucus from your lungs. Controlled coughing is a series of short, progressive coughs. The steps of controlled coughing are:  1. Lean your head slightly forward.  2. Breathe in deeply using  diaphragmatic breathing.  3. Try to hold your breath for 3 seconds.  4. Keep your mouth slightly open while coughing twice.  5. Spit any mucus out into a tissue.  6. Rest and repeat the steps once or twice as needed.  General instructions  · Make sure you receive all the vaccines that your health care provider recommends, especially the pneumococcal and influenza vaccines. Preventing infection and hospitalization is very important when you have COPD.  · Use oxygen therapy and pulmonary rehabilitation if directed to by your health care provider. If you require home oxygen therapy, ask your health care provider whether you should purchase a pulse oximeter to measure your oxygen level at home.  · Work with your health care provider to develop a COPD action plan. This will help you know what steps to take if your condition gets worse.  · Keep other chronic health conditions under control as told by your health care provider.  · Avoid extreme temperature and humidity changes.  · Avoid contact with people who have an illness that spreads from person to person (is contagious), such as viral infections or pneumonia.  · Keep all follow-up visits as told by your health care provider. This is important.  Contact a health care provider if:  · You are coughing up more mucus than usual.  · There is a change in the color or thickness of your mucus.  · Your breathing is more labored than usual.  · Your breathing is faster than usual.  · You have difficulty sleeping.  · You need to use your rescue medicines or inhalers more often than expected.  · You have trouble doing routine activities such as getting dressed or walking around the house.  Get help right away if:  · You have shortness of breath while you are resting.  · You have shortness of breath that prevents you from:  ? Being able to talk.  ? Performing your usual physical activities.  · You have chest pain lasting longer than 5 minutes.  · Your skin color is more blue  (cyanotic) than usual.  · You measure low oxygen saturations for longer than 5 minutes with a pulse oximeter.  · You have a fever.  · You feel too tired to breathe normally.  Summary  · Chronic obstructive pulmonary disease (COPD) is a long-term (chronic) condition that affects the lungs.  · Your lung function will probably never return to normal. In most cases, it gets worse over time. However, there are steps you can take to slow the progression of the disease and improve your quality of life.  · Treatment for COPD may include taking medicines, quitting smoking, pulmonary rehabilitation, and changes to diet and exercise. As the disease progresses, you may need oxygen therapy, a lung transplant, or palliative care.  · To help manage your condition, do not smoke, avoid exposure to things that irritate your lungs, stay up to date on all vaccines, and follow your health care provider's instructions for taking medicines.  This information is not intended to replace advice given to you by your health care provider. Make sure you discuss any questions you have with your health care provider.  Document Released: 09/27/2006 Document Revised: 06/13/2018 Document Reviewed: 01/22/2018  [employer] Interactive Patient Education © 2019 [employer]        Return in about 4 months (around 4/24/2020).    Ambulatory progress note signed and attested to by Lonny Pugh D.O.

## 2020-01-22 ENCOUNTER — OFFICE VISIT (OUTPATIENT)
Dept: CARDIOLOGY | Facility: HOSPITAL | Age: 65
End: 2020-01-22

## 2020-01-22 VITALS
OXYGEN SATURATION: 95 % | HEART RATE: 82 BPM | HEIGHT: 69 IN | BODY MASS INDEX: 29.98 KG/M2 | DIASTOLIC BLOOD PRESSURE: 76 MMHG | TEMPERATURE: 96.9 F | RESPIRATION RATE: 16 BRPM | SYSTOLIC BLOOD PRESSURE: 124 MMHG

## 2020-01-22 DIAGNOSIS — Z72.0 TOBACCO USE: ICD-10-CM

## 2020-01-22 DIAGNOSIS — J44.9 CHRONIC OBSTRUCTIVE PULMONARY DISEASE, UNSPECIFIED COPD TYPE (HCC): ICD-10-CM

## 2020-01-22 DIAGNOSIS — I50.22 CHRONIC SYSTOLIC HEART FAILURE (HCC): Primary | ICD-10-CM

## 2020-01-22 PROCEDURE — 99214 OFFICE O/P EST MOD 30 MIN: CPT | Performed by: NURSE PRACTITIONER

## 2020-01-22 RX ORDER — FUROSEMIDE 40 MG/1
TABLET ORAL
Qty: 30 TABLET | Refills: 11
Start: 2020-01-22 | End: 2020-02-24

## 2020-01-22 RX ORDER — CARVEDILOL 12.5 MG/1
12.5 TABLET ORAL 2 TIMES DAILY
Qty: 60 TABLET | Refills: 11 | Status: SHIPPED | OUTPATIENT
Start: 2020-01-22 | End: 2020-10-05 | Stop reason: SDUPTHER

## 2020-01-22 NOTE — PROGRESS NOTES
[facility]  [facility]      Encounter Date:01/22/2020     Kb Walker  [address] [place] [address]  [unfilled]    1955    Lonny Pugh DO    Kb Walker is a 64 y.o. male.      Subjective:     Chief Complaint:  Congestive Heart Failure and Follow-up       HPI 64 year old male presents to the office today for ongoing evaluation of his chronic systolic heart failure. He notes he is doing significantly better. He is now smoking 2-3 cigarettes a day down from 2 ppd. He denies cp, dizziness, dyspnea, presyncope, syncope, orthopnea, pnd, pedal edema. He notes that he tires more easily with significant exertion.   Notes bps have been 110-120s.       Patient Active Problem List   Diagnosis   • Acute respiratory failure with hypoxia (CMS/HCC)   • Personal history of tobacco use, presenting hazards to health   • Hyperglycemia   • Acute CHF (CMS/HCC)       Past Medical History:   Diagnosis Date   • Arthritis    • Gall stones        History reviewed. No pertinent surgical history.    Family History   Problem Relation Age of Onset   • Arthritis Mother    • Arthritis Father    • Hypertension Father    • Heart failure Father    • Coronary artery disease Brother        Social History     Socioeconomic History   • Marital status:      Spouse name: Not on file   • Number of children: Not on file   • Years of education: Not on file   • Highest education level: Not on file   Tobacco Use   • Smoking status: Current Every Day Smoker     Packs/day: 1.00     Years: 50.00     Pack years: 50.00     Types: Cigarettes   • Smokeless tobacco: Never Used   • Tobacco comment: placed the patch 11/27/2019   Substance and Sexual Activity   • Alcohol use: Yes     Frequency: Never     Comment: occasional   • Drug use: No   • Sexual activity: Defer   Social History Narrative    caffeine use: 2-3 serving of coffee a day om average       No Known Allergies      Current Outpatient  Medications:   •  acetaminophen (TYLENOL) 325 MG tablet, Take 650 mg by mouth Every 6 (Six) Hours As Needed for Mild Pain ., Disp: , Rfl:   •  albuterol sulfate  (90 Base) MCG/ACT inhaler, Inhale 2 puffs Every 4 (Four) Hours As Needed., Disp: , Rfl:   •  budesonide-formoterol (SYMBICORT) 160-4.5 MCG/ACT inhaler, Inhale 2 puffs 2 (Two) Times a Day., Disp: , Rfl:   •  diphenhydrAMINE (BENADRYL) 50 MG tablet, Take 1 tablet by mouth At Night As Needed., Disp: , Rfl:   •  furosemide (LASIX) 40 MG tablet, Take 1 tablet daily, Disp: 30 tablet, Rfl: 11  •  lisinopril (PRINIVIL,ZESTRIL) 10 MG tablet, Take 1 tablet by mouth Daily., Disp: 30 tablet, Rfl: 11  •  Magnesium Oxide -Mg Supplement 250 MG tablet, Take 1 tablet by mouth Daily., Disp: 90 tablet, Rfl: 3  •  nicotine polacrilex (NICORETTE) 2 MG gum, Chew 2 mg As Needed for Smoking Cessation., Disp: , Rfl:   •  omeprazole (priLOSEC) 20 MG capsule, Take 20 mg by mouth Daily., Disp: , Rfl:   •  ondansetron ODT (ZOFRAN-ODT) 4 MG disintegrating tablet, DISSOLVE 1 TABLET IN MOUTH 3 TIMES A DAY AS NEEDED NAUSEA, Disp: , Rfl: 0  •  potassium chloride (K-DUR) 10 MEQ CR tablet, Take 1 tablet by mouth Daily. Take with Lasix to prevent muscle cramps, Disp: 90 tablet, Rfl: 3  •  Probiotic Product (PROBIOTIC-10 PO), Probiotic 10 billion cell capsule  1 tab PO daily for diarrhea, Disp: , Rfl:   •  traZODone (DESYREL) 100 MG tablet, Take 100 mg by mouth At Night As Needed., Disp: , Rfl:   •  carvedilol (COREG) 6.25. MG tablet, Take 1 tablet by mouth 2 (Two) Times a Day., Disp: 60 tablet, Rfl: 11    The following portions of the patient's history were reviewed today and updated as appropriate: allergies, current medications, past family history, past medical history, past social history, past surgical history and problem list     Review of Systems   Constitution: Positive for malaise/fatigue. Negative for chills, decreased appetite, diaphoresis, fever, night sweats, weight gain  "and weight loss.   HENT: Negative for congestion, hearing loss, hoarse voice and nosebleeds.    Eyes: Negative for blurred vision, visual disturbance and visual halos.   Cardiovascular: Negative for chest pain, claudication, cyanosis, dyspnea on exertion, irregular heartbeat, leg swelling, near-syncope, orthopnea, palpitations, paroxysmal nocturnal dyspnea and syncope.   Respiratory: Negative for cough, hemoptysis, shortness of breath, sleep disturbances due to breathing, snoring, sputum production and wheezing.    Hematologic/Lymphatic: Negative for bleeding problem. Does not bruise/bleed easily.   Skin: Negative for dry skin, itching and rash.   Musculoskeletal: Negative for arthritis, falls, joint pain, joint swelling and myalgias.   Gastrointestinal: Negative for bloating, abdominal pain, constipation, diarrhea, flatus, heartburn, hematemesis, hematochezia, melena, nausea and vomiting.   Genitourinary: Negative for dysuria, frequency, hematuria, nocturia and urgency.   Neurological: Negative for excessive daytime sleepiness, dizziness, headaches, light-headedness, loss of balance and weakness.   Psychiatric/Behavioral: Negative for depression. The patient does not have insomnia and is not nervous/anxious.        Objective:     Vitals:    01/22/20 1405   BP: 124/76   BP Location: Right arm   Patient Position: Sitting   Cuff Size: Adult   Pulse: 82   Resp: 16   Temp: 96.9 °F (36.1 °C)   TempSrc: Temporal   SpO2: 95%   Height: 175.3 cm (69\")     Body mass index is 29.98 kg/m².  Physical Exam   Constitutional: He is oriented to person, place, and time. He appears well-developed and well-nourished. He is active and cooperative. No distress.   HENT:   Head: Normocephalic and atraumatic.   Mouth/Throat: Oropharynx is clear and moist.   Eyes: Pupils are equal, round, and reactive to light. Conjunctivae and EOM are normal.   Neck: Normal range of motion. Neck supple. No JVD present. No tracheal deviation present. No " thyromegaly present.   Cardiovascular: Normal rate, regular rhythm, normal heart sounds and intact distal pulses.   Pulmonary/Chest: Effort normal and breath sounds normal.   Abdominal: Soft. Bowel sounds are normal. He exhibits no distension. There is no tenderness.   Musculoskeletal: Normal range of motion.   Neurological: He is alert and oriented to person, place, and time.   Skin: Skin is warm, dry and intact.   Psychiatric: He has a normal mood and affect. His behavior is normal.   Nursing note and vitals reviewed.      Lab and Diagnostic Review:  Results for orders placed or performed in visit on 11/26/19   Basic Metabolic Panel   Result Value Ref Range    Glucose 92 65 - 99 mg/dL    BUN 16 8 - 23 mg/dL    Creatinine 1.14 0.76 - 1.27 mg/dL    Sodium 140 136 - 145 mmol/L    Potassium 5.1 3.5 - 5.2 mmol/L    Chloride 97 (L) 98 - 107 mmol/L    CO2 31.2 (H) 22.0 - 29.0 mmol/L    Calcium 9.7 8.6 - 10.5 mg/dL    eGFR Non African Amer 64 >60 mL/min/1.73    BUN/Creatinine Ratio 14.0 7.0 - 25.0    Anion Gap 11.8 5.0 - 15.0 mmol/L   Magnesium   Result Value Ref Range    Magnesium 2.3 1.6 - 2.4 mg/dL         Assessment and Plan:   1. Chronic systolic heart failure (CMS/HCC)  uptitrate coreg to 12. 5mg bid  Decrease lasix to 20 mg daily  Heart failure education today including signs and symptoms, the role of the heart failure center, daily weights, low sodium diet (less than 1500 mg per day), and daily physical activity. Reviewed HF Zones with patient and family.  Patient to continue current medications as previously ordered.     2. Chronic obstructive pulmonary disease, unspecified COPD type (CMS/HCC)  Without exacerbation     3. Tobacco use  Congratulated patient on recent cessation (2 ppd down to 2-3 cigarettes). Encouraged total cessation.  It is very important that he quit smoking. There are various alternatives available to help with this difficult task, but first and foremost, he must make a firm commitment and  decision to quit. The nature of nicotine addiction is discussed. The correct use, cost and side effects of nicotine replacement therapy such as gum or patches was discussed. Bupropion and its cost (sometimes not covered fully by insurance) and side effects are reviewed.  I recommend he not allow potential costs of treatment to deter him from using nicotine replacement therapy or bupropion, as the long term economic and health benefits are obvious. Education: 2 minutes         It has been a pleasure to participate in the care of this patient.  Patient was instructed to call the [facility] with any questions, concerns, or worsening symptoms.    *Please note that portions of this note were completed with a voice recognition program. Efforts were made to edit the dictations, but occasionally words are mistranscribed.

## 2020-01-22 NOTE — PATIENT INSTRUCTIONS
Increase coreg to 12.5 mg twice a day (take 2 tablets twice a day ) until u run out of current prescription  Cut lasix to 1/2 tablet daily (20 mg)

## 2020-02-24 ENCOUNTER — OFFICE VISIT (OUTPATIENT)
Dept: CARDIOLOGY | Facility: CLINIC | Age: 65
End: 2020-02-24

## 2020-02-24 VITALS
HEIGHT: 69 IN | SYSTOLIC BLOOD PRESSURE: 110 MMHG | WEIGHT: 198 LBS | OXYGEN SATURATION: 95 % | HEART RATE: 90 BPM | DIASTOLIC BLOOD PRESSURE: 64 MMHG | BODY MASS INDEX: 29.33 KG/M2

## 2020-02-24 DIAGNOSIS — I34.0 NON-RHEUMATIC MITRAL REGURGITATION: ICD-10-CM

## 2020-02-24 DIAGNOSIS — I50.22 CHRONIC SYSTOLIC HEART FAILURE (HCC): Primary | ICD-10-CM

## 2020-02-24 PROCEDURE — 99214 OFFICE O/P EST MOD 30 MIN: CPT | Performed by: INTERNAL MEDICINE

## 2020-02-24 RX ORDER — FUROSEMIDE 40 MG/1
TABLET ORAL
Qty: 30 TABLET | Refills: 11
Start: 2020-02-24 | End: 2020-10-05 | Stop reason: SDUPTHER

## 2020-02-24 NOTE — PROGRESS NOTES
[facility] AT [address], [address]  [place], [place], [address] [phone]  [email or web address]           OUTPATIENT CLINIC FOLLOW UP NOTE    Patient Care Team:  Patient Care Team:  Lonny Pugh DO as PCP - General (Family Medicine)    Subjective:      Chief Complaint   Patient presents with   • Chronic systolic heart failure (CMS/HCC)       HPI:    Kb Walker is a 64 y.o. male.  Cardiac problem list:  1. Systolic heart failure, EF 36 to 40%, diagnosed 11/2019.  Negative stress test for evidence of ischemia 11/2019  2. Moderate to severe mitral regurgitation, diagnosed 11/2019  3. Tobacco dependence    The patient presents today for follow-up.    Since his hospitalization and after having a couple medicines further titrated, the patient is doing well from a cardiac standpoint.  Denies chest pain, dyspnea, PND, orthopnea, lower extremity swelling, palpitations.  Taking his medicines without difficulty.  [place] without cardiopulmonary complaints.    Review of Systems:  Negative for exertional chest pain, dyspnea with exertion, orthopnea, PND, lower extremity edema, palpitations, lightheadedness, syncope.     PFSH:  Patient Active Problem List   Diagnosis   • Acute respiratory failure with hypoxia (CMS/HCC)   • Personal history of tobacco use, presenting hazards to health   • Hyperglycemia   • Chronic systolic heart failure (CMS/HCC)   • Non-rheumatic mitral regurgitation         Current Outpatient Medications:   •  acetaminophen (TYLENOL) 325 MG tablet, Take 650 mg by mouth Every 6 (Six) Hours As Needed for Mild Pain ., Disp: , Rfl:   •  albuterol sulfate  (90 Base) MCG/ACT inhaler, Inhale 2 puffs Every 4 (Four) Hours As Needed., Disp: , Rfl:   •  carvedilol (COREG) 12.5 MG tablet, Take 1 tablet by mouth 2 (Two) Times a Day., Disp: 60 tablet, Rfl: 11  •  diphenhydrAMINE (BENADRYL) 50 MG tablet, Take 1 tablet by mouth At Night As Needed.,  "Disp: , Rfl:   •  furosemide (LASIX) 40 MG tablet, Take one tablet daily., Disp: 30 tablet, Rfl: 11  •  lisinopril (PRINIVIL,ZESTRIL) 10 MG tablet, Take 1 tablet by mouth Daily., Disp: 30 tablet, Rfl: 11  •  Magnesium Oxide -Mg Supplement 250 MG tablet, Take 1 tablet by mouth Daily., Disp: 90 tablet, Rfl: 3  •  nicotine polacrilex (NICORETTE) 2 MG gum, Chew 2 mg As Needed for Smoking Cessation., Disp: , Rfl:   •  omeprazole (priLOSEC) 20 MG capsule, Take 20 mg by mouth Daily., Disp: , Rfl:   •  potassium chloride (K-DUR) 10 MEQ CR tablet, Take 1 tablet by mouth Daily. Take with Lasix to prevent muscle cramps, Disp: 90 tablet, Rfl: 3    No Known Allergies     reports that he has been smoking cigarettes. He has a 12.50 pack-year smoking history. He has never used smokeless tobacco.    Family History   Problem Relation Age of Onset   • Arthritis Mother    • Arthritis Father    • Hypertension Father    • Heart failure Father    • Coronary artery disease Brother          Objective:   Physical exam:  /64 (BP Location: Right arm, Patient Position: Sitting)   Pulse 90   Ht 175.3 cm (69\")   Wt 89.8 kg (198 lb)   SpO2 95%   BMI 29.24 kg/m²   CONSTITUTIONAL: No acute distress  RESPIRATORY: Normal effort. Clear to auscultation bilaterally without wheezing or rales  CARDIOVASCULAR: Carotids with normal upstrokes without bruits.  Regular rate and rhythm with normal S1 and S2. Without murmur, gallop or rub. Normal radial pulse. There is no lower extremity edema bilaterally.  PSYCH: Normal affect    Labs:    BUN   Date Value Ref Range Status   11/26/2019 16 8 - 23 mg/dL Final     Creatinine   Date Value Ref Range Status   11/26/2019 1.14 0.76 - 1.27 mg/dL Final     Potassium   Date Value Ref Range Status   11/26/2019 5.1 3.5 - 5.2 mmol/L Final     ALT (SGPT)   Date Value Ref Range Status   11/18/2019 33 1 - 41 U/L Final     AST (SGOT)   Date Value Ref Range Status   11/18/2019 39 1 - 40 U/L Final       Lab Results "   Component Value Date    CHOL 148 11/19/2019     Lab Results   Component Value Date    TRIG 115 11/19/2019     Lab Results   Component Value Date    HDL 36 (L) 11/19/2019     Lab Results   Component Value Date    LDL 89 11/19/2019     No components found for: LDLDIRECTC    Diagnostic Data:    Procedures    TTE 11/2019  · Left ventricular systolic function is moderately decreased. Estimated EF appears to be in the range of 36 - 40%.  · There is left ventricular global hypokinesis noted.  · Left ventricular diastolic dysfunction (grade III) consistent with reversible restrictive pattern.  · Right ventricular cavity is borderline dilated.  · Left atrial cavity size is severely dilated.  · Right atrial cavity size is moderately dilated.  · Moderate-to-severe mitral valve regurgitation is present  · Mild tricuspid valve regurgitation is present.  · Estimated right ventricular systolic pressure from tricuspid regurgitation is mildly elevated (35-45 mmHg).    Stress test 11/2019  · Left ventricular ejection fraction is moderately reduced (Calculated EF = 36%). The left ventricle was moderately dilated and had diffuse hypokinesis.  · Myocardial perfusion imaging indicates a normal myocardial perfusion study with no evidence of ischemia.  · There was coronary artery calcification noted in the territory of the LAD.  · Impressions are consistent with an intermediate risk study.    Assessment and Plan:   Kb was seen today for chronic systolic heart failure (cms/hcc).    Diagnoses and all orders for this visit:    Chronic systolic heart failure (CMS/HCC)  Non-rheumatic mitral regurgitation  -NYHA functional class I-II  -Continue carvedilol, lisinopril, Lasix 20 mg daily  -Repeat echocardiogram to reassess EF and mitral regurgitation now that the patient is euvolemic    Tobacco dependence  -The patient is aggressively trying to quit.  Making progress      - Return in about 6 months (around 8/24/2020).    Laurent Flores MD,  MSc, FACC

## 2020-03-02 ENCOUNTER — HOSPITAL ENCOUNTER (OUTPATIENT)
Dept: CARDIOLOGY | Facility: HOSPITAL | Age: 65
Discharge: HOME | End: 2020-03-02
Admitting: INTERNAL MEDICINE

## 2020-03-02 VITALS — BODY MASS INDEX: 29.33 KG/M2 | HEIGHT: 69 IN | WEIGHT: 198 LBS

## 2020-03-02 DIAGNOSIS — I34.0 NON-RHEUMATIC MITRAL REGURGITATION: ICD-10-CM

## 2020-03-02 DIAGNOSIS — I50.22 CHRONIC SYSTOLIC HEART FAILURE (HCC): ICD-10-CM

## 2020-03-02 PROCEDURE — 93306 TTE W/DOPPLER COMPLETE: CPT | Performed by: INTERNAL MEDICINE

## 2020-03-02 PROCEDURE — 93306 TTE W/DOPPLER COMPLETE: CPT

## 2020-03-03 LAB
BH CV ECHO MEAS - AO ROOT AREA (BSA CORRECTED): 1.5
BH CV ECHO MEAS - AO ROOT AREA: 7.1 CM^2
BH CV ECHO MEAS - AO ROOT DIAM: 3 CM
BH CV ECHO MEAS - BSA(HAYCOCK): 2.1 M^2
BH CV ECHO MEAS - BSA: 2.1 M^2
BH CV ECHO MEAS - BZI_BMI: 29.2 KILOGRAMS/M^2
BH CV ECHO MEAS - BZI_METRIC_HEIGHT: 175.3 CM
BH CV ECHO MEAS - BZI_METRIC_WEIGHT: 89.8 KG
BH CV ECHO MEAS - EDV(CUBED): 173.7 ML
BH CV ECHO MEAS - EDV(MOD-SP2): 90 ML
BH CV ECHO MEAS - EDV(MOD-SP4): 113 ML
BH CV ECHO MEAS - EDV(TEICH): 152.4 ML
BH CV ECHO MEAS - EF(CUBED): 42.1 %
BH CV ECHO MEAS - EF(MOD-BP): 37 %
BH CV ECHO MEAS - EF(MOD-SP2): 42.2 %
BH CV ECHO MEAS - EF(MOD-SP4): 31 %
BH CV ECHO MEAS - EF(TEICH): 34.5 %
BH CV ECHO MEAS - ESV(CUBED): 100.5 ML
BH CV ECHO MEAS - ESV(MOD-SP2): 52 ML
BH CV ECHO MEAS - ESV(MOD-SP4): 78 ML
BH CV ECHO MEAS - ESV(TEICH): 99.8 ML
BH CV ECHO MEAS - FS: 16.7 %
BH CV ECHO MEAS - IVS/LVPW: 0.98
BH CV ECHO MEAS - IVSD: 1.2 CM
BH CV ECHO MEAS - LA DIMENSION: 3.5 CM
BH CV ECHO MEAS - LA/AO: 1.2
BH CV ECHO MEAS - LAD MAJOR: 5.5 CM
BH CV ECHO MEAS - LAT PEAK E' VEL: 5.6 CM/SEC
BH CV ECHO MEAS - LATERAL E/E' RATIO: 9.8
BH CV ECHO MEAS - LV DIASTOLIC VOL/BSA (35-75): 54.9 ML/M^2
BH CV ECHO MEAS - LV MASS(C)D: 283.6 GRAMS
BH CV ECHO MEAS - LV MASS(C)DI: 137.9 GRAMS/M^2
BH CV ECHO MEAS - LV SYSTOLIC VOL/BSA (12-30): 37.9 ML/M^2
BH CV ECHO MEAS - LVIDD: 5.6 CM
BH CV ECHO MEAS - LVIDS: 4.7 CM
BH CV ECHO MEAS - LVLD AP2: 7.8 CM
BH CV ECHO MEAS - LVLD AP4: 7.8 CM
BH CV ECHO MEAS - LVLS AP2: 7.1 CM
BH CV ECHO MEAS - LVLS AP4: 7.2 CM
BH CV ECHO MEAS - LVPWD: 1.2 CM
BH CV ECHO MEAS - MED PEAK E' VEL: 4.9 CM/SEC
BH CV ECHO MEAS - MEDIAL E/E' RATIO: 11.1
BH CV ECHO MEAS - MR ALIAS VEL: 38.5 CM/SEC
BH CV ECHO MEAS - MR ERO: 0.28 CM^2
BH CV ECHO MEAS - MR FLOW RATE: 154.8 CM^3/SEC
BH CV ECHO MEAS - MR MAX PG: 123 MMHG
BH CV ECHO MEAS - MR MAX VEL: 555 CM/SEC
BH CV ECHO MEAS - MR MEAN PG: 73 MMHG
BH CV ECHO MEAS - MR MEAN VEL: 394 CM/SEC
BH CV ECHO MEAS - MR PISA RADIUS: 0.8 CM
BH CV ECHO MEAS - MR PISA: 4 CM^2
BH CV ECHO MEAS - MR VOLUME: 51 ML
BH CV ECHO MEAS - MR VTI: 183 CM
BH CV ECHO MEAS - MV A MAX VEL: 78 CM/SEC
BH CV ECHO MEAS - MV DEC TIME: 0.23 SEC
BH CV ECHO MEAS - MV E MAX VEL: 54.8 CM/SEC
BH CV ECHO MEAS - MV E/A: 0.7
BH CV ECHO MEAS - PA ACC SLOPE: 515 CM/SEC^2
BH CV ECHO MEAS - PA ACC TIME: 0.1 SEC
BH CV ECHO MEAS - PA PR(ACCEL): 34.5 MMHG
BH CV ECHO MEAS - RVDD: 2.8 CM
BH CV ECHO MEAS - SI(CUBED): 35.6 ML/M^2
BH CV ECHO MEAS - SI(MOD-SP2): 18.5 ML/M^2
BH CV ECHO MEAS - SI(MOD-SP4): 17 ML/M^2
BH CV ECHO MEAS - SI(TEICH): 25.6 ML/M^2
BH CV ECHO MEAS - SV(CUBED): 73.2 ML
BH CV ECHO MEAS - SV(MOD-SP2): 38 ML
BH CV ECHO MEAS - SV(MOD-SP4): 35 ML
BH CV ECHO MEAS - SV(TEICH): 52.6 ML
BH CV ECHO MEAS - TAPSE (>1.6): 2.4 CM2
BH CV ECHO MEASUREMENTS AVERAGE E/E' RATIO: 10.44
BH CV VAS BP RIGHT ARM: NORMAL MMHG
BH CV XLRA - TDI S': 12.1 CM/SEC
LEFT ATRIUM VOLUME INDEX: 26.7 ML/M^2
LEFT ATRIUM VOLUME: 55 ML
MAXIMAL PREDICTED HEART RATE: 154 BPM
STRESS TARGET HR: 131 BPM

## 2020-03-10 ENCOUNTER — TELEPHONE (OUTPATIENT)
Dept: CARDIOLOGY | Facility: CLINIC | Age: 65
End: 2020-03-10

## 2020-03-10 NOTE — TELEPHONE ENCOUNTER
Patient contacted to review recent echo results. Patient verbalizes understanding, all questions answered at this time.

## 2020-03-10 NOTE — TELEPHONE ENCOUNTER
----- Message from Laurent Flores MD sent at 3/9/2020  5:41 PM EDT -----  Please let the patient know that his overall heart pump strength is stable.  His leaky valve is mildly improved when compared to his last ultrasound.  No changes, continue current medicines

## 2020-04-24 ENCOUNTER — TELEMEDICINE (OUTPATIENT)
Dept: FAMILY MEDICINE CLINIC | Facility: CLINIC | Age: 65
End: 2020-04-24

## 2020-04-24 DIAGNOSIS — Z87.891 PERSONAL HISTORY OF TOBACCO USE, PRESENTING HAZARDS TO HEALTH: ICD-10-CM

## 2020-04-24 DIAGNOSIS — J44.9 STAGE 2 MODERATE COPD BY GOLD CLASSIFICATION (HCC): ICD-10-CM

## 2020-04-24 DIAGNOSIS — I50.22 CHRONIC SYSTOLIC HEART FAILURE (HCC): Primary | ICD-10-CM

## 2020-04-24 PROCEDURE — 99214 OFFICE O/P EST MOD 30 MIN: CPT | Performed by: FAMILY MEDICINE

## 2020-04-24 NOTE — PATIENT INSTRUCTIONS
1.  Continue to decrease smoking amount.    2.  Follow up as scheduled with cardiology.    3.  Follow up in 1 year or earlier if needed.

## 2020-04-24 NOTE — PROGRESS NOTES
Subjective   Kb Walker is a 64 y.o. male.     Chief Complaint   Patient presents with   • Follow-up       History of Present Illness     Kb Walker presents today for   Chief Complaint   Patient presents with   • Follow-up     He is here today to follow-up on new onset heart failure. He was hospitalized in November from 11/18 to 11/20 with acute on chronic systolic heart failure.  An echo during his hospitalization showed an ejection fraction of 36 to 40%.  That showed normal myocardial perfusion with no evidence of ischemia.  There was coronary artery calcification noted in the LAD.  He denies chest pain, dyspnea, pedal edema rotations or orthopnea.  He is doing extremely well today and has no acute complaints.  He had pulmonary function testing completed at the beginning of December, but canceled his follow-up appointment with pulmonology.  PFTs were consistent with stage II/moderate COPD. He saw cardiology on 1/22 and 2/24. He had an echo on 3/2/20 which showed stable ejection fraction and mildly improved valve function. He has had no need for albuterol. Weight is remaining stable 195-200. He is taking the Lasix daily.    Smoking less than before, under 10 per day. Using nicorette gum most days. 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    You have chosen to receive care through a telehealth visit.  Do you consent to use a video/audio connection for your medical care today? Yes. He is accompanied by his wife Blanca.    The following portions of the patient's history were reviewed and updated as appropriate: allergies, current medications, past family history, past medical history, past social history, past surgical history and problem list.    Active Ambulatory Problems     Diagnosis Date Noted   • Acute respiratory failure with hypoxia (CMS/HCC) 11/18/2019   • Personal history of tobacco use, presenting hazards to health 11/18/2019   • Hyperglycemia 11/18/2019   • Chronic  systolic heart failure (CMS/[facility]) 11/18/2019   • Non-rheumatic mitral regurgitation 02/24/2020   • Stage 2 moderate COPD by GOLD classification (CMS/[facility]) 04/24/2020     Resolved Ambulatory Problems     Diagnosis Date Noted   • No Resolved Ambulatory Problems     Past Medical History:   Diagnosis Date   • Arthritis    • Gall stones    • Pneumonia 12/2019     History reviewed. No pertinent surgical history.  Family History   Problem Relation Age of Onset   • Arthritis Mother    • Arthritis Father    • Hypertension Father    • Heart failure Father    • Coronary artery disease Brother      Social History     Socioeconomic History   • Marital status:      Spouse name: Not on file   • Number of children: Not on file   • Years of education: Not on file   • Highest education level: Not on file   Tobacco Use   • Smoking status: Current Every Day Smoker     Packs/day: 0.25     Years: 50.00     Pack years: 12.50     Types: Cigarettes   • Smokeless tobacco: Never Used   • Tobacco comment: placed the patch 11/27/2019   Substance and Sexual Activity   • Alcohol use: Not Currently     Frequency: Never     Comment: occasional   • Drug use: No   • Sexual activity: Defer   Social History Narrative    caffeine use: 2-3 serving of coffee a day om average       Review of Systems  Review of Systems -  General ROS: negative for - chills, fever or night sweats  Cardiovascular ROS: no chest pain or dyspnea on exertion  Gastrointestinal ROS: no abdominal pain, change in bowel habits, or black or bloody stools  Genito-Urinary ROS: no dysuria, trouble voiding, or hematuria    Objective   There were no vitals taken for this visit.  Vitals obtained from patient if available  Physical Exam  Const: Non-toxic appearing, NAD, A&Ox4, Pleasant, Cooperative  Eyes: EOMI, no conjunctivitis  ENT: No copious nasal drainage noted  Cardiac: Regular rate by pulse  Resp: Respiratory rate observed to be within normal limits, no increased work of breathing  observed, no audible wheezing or cough noted  Psych: Appropriate mood and behavior.  Procedures  Assessment/Plan   Problem List Items Addressed This Visit        Cardiovascular and Mediastinum    Chronic systolic heart failure (CMS/HCC) - Primary    Overview     Echo 3/2/20  · Left ventricular systolic function is moderately decreased. Estimated EF appears to be in the range of 36 - 40%.  · Left ventricular wall thickness is consistent with mild concentric hypertrophy.  · The left ventricular cavity is mildly dilated.  · There is left ventricular global hypokinesis noted.  · Left ventricular diastolic dysfunction (grade I a) consistent with impaired relaxation.  · Moderate mitral valve regurgitation is present            Respiratory    Stage 2 moderate COPD by GOLD classification (CMS/[facility])       Other    Personal history of tobacco use, presenting hazards to health        Currently stable, doing well.    See patient diagnoses and orders along with patient instructions for assessment, plan, and changes to care for patient.    This visit was conducted via telemedicine with live video and audio provided through [facility]: [employer] at the point of care.    Patient Instructions   1.  Continue to decrease smoking amount.    2.  Follow up as scheduled with cardiology.    3.  Follow up in 1 year or earlier if needed.      Return in about 1 year (around 4/24/2021) for (fasting blood work 1 week prior to appt).    Ambulatory progress note signed and attested to by Lonny Pugh D.O.

## 2020-10-05 ENCOUNTER — OFFICE VISIT (OUTPATIENT)
Dept: CARDIOLOGY | Facility: CLINIC | Age: 65
End: 2020-10-05

## 2020-10-05 VITALS
SYSTOLIC BLOOD PRESSURE: 112 MMHG | BODY MASS INDEX: 28.88 KG/M2 | DIASTOLIC BLOOD PRESSURE: 64 MMHG | WEIGHT: 195 LBS | HEART RATE: 72 BPM | HEIGHT: 69 IN

## 2020-10-05 DIAGNOSIS — F17.200 TOBACCO DEPENDENCE: ICD-10-CM

## 2020-10-05 DIAGNOSIS — I34.0 NON-RHEUMATIC MITRAL REGURGITATION: ICD-10-CM

## 2020-10-05 DIAGNOSIS — I50.22 CHRONIC SYSTOLIC HEART FAILURE (HCC): Primary | ICD-10-CM

## 2020-10-05 PROCEDURE — 99213 OFFICE O/P EST LOW 20 MIN: CPT | Performed by: NURSE PRACTITIONER

## 2020-10-05 RX ORDER — CARVEDILOL 12.5 MG/1
12.5 TABLET ORAL 2 TIMES DAILY
Qty: 180 TABLET | Refills: 3 | Status: SHIPPED | OUTPATIENT
Start: 2020-10-05 | End: 2021-11-15 | Stop reason: SDUPTHER

## 2020-10-05 RX ORDER — FUROSEMIDE 40 MG/1
TABLET ORAL
Qty: 90 TABLET | Refills: 3 | Status: SHIPPED | OUTPATIENT
Start: 2020-10-05 | End: 2021-11-09

## 2020-10-05 RX ORDER — POTASSIUM CHLORIDE 750 MG/1
10 TABLET, FILM COATED, EXTENDED RELEASE ORAL DAILY
Qty: 90 TABLET | Refills: 3 | Status: SHIPPED | OUTPATIENT
Start: 2020-10-05 | End: 2021-11-09

## 2020-10-05 RX ORDER — LISINOPRIL 10 MG/1
10 TABLET ORAL
Qty: 90 TABLET | Refills: 3 | Status: SHIPPED | OUTPATIENT
Start: 2020-10-05 | End: 2021-11-09

## 2020-10-05 NOTE — PROGRESS NOTES
[facility] AT [address], [address]  [place], [address] [phone]  WWW.[facility]           OUTPATIENT CLINIC FOLLOW UP NOTE    Patient Care Team:  Patient Care Team:  Lonny Pugh DO as PCP - General (Family Medicine)  Lonny Pugh DO as PCP - Claims Attributed    Subjective:      Chief Complaint   Patient presents with   • Chronic systolic heart failure (CMS/HCC)       HPI:    Kb Walker is a 64 y.o. male.  Cardiac problem list:  1. Systolic heart failure, EF 36 to 40%, diagnosed 11/2019.  Negative stress test for evidence of ischemia 11/2019  2. Moderate to severe mitral regurgitation, diagnosed 11/2019  1. Noted to be moderate in 3/2020 after fluid volume status improved.  3. Tobacco dependence    The patient presents today for follow-up.    Since patient was last seen he reports that he has been doing well from a cardiac standpoint.  He denies any chest pain, shortness of breath, palpitations, or lower extremity edema.  He reports that he was able to bill square bills paid this summer and 90 degree weather without significant difficulty.    Review of Systems:  Negative for exertional chest pain, dyspnea with exertion, orthopnea, PND, lower extremity edema, palpitations, lightheadedness, syncope.     PFSH:  Patient Active Problem List   Diagnosis   • Acute respiratory failure with hypoxia (CMS/HCC)   • Personal history of tobacco use, presenting hazards to health   • Hyperglycemia   • Chronic systolic heart failure (CMS/HCC)   • Non-rheumatic mitral regurgitation   • Stage 2 moderate COPD by GOLD classification (CMS/HCC)         Current Outpatient Medications:   •  acetaminophen (TYLENOL) 325 MG tablet, Take 650 mg by mouth Every 6 (Six) Hours As Needed for Mild Pain ., Disp: , Rfl:   •  albuterol sulfate  (90 Base) MCG/ACT inhaler, Inhale 2 puffs Every 4 (Four) Hours As Needed., Disp: , Rfl:   •  carvedilol (COREG) 12.5 MG  "tablet, Take 1 tablet by mouth 2 (Two) Times a Day., Disp: 60 tablet, Rfl: 11  •  diphenhydrAMINE (BENADRYL) 50 MG tablet, Take 1 tablet by mouth At Night As Needed., Disp: , Rfl:   •  furosemide (LASIX) 40 MG tablet, Take one tablet daily., Disp: 30 tablet, Rfl: 11  •  lisinopril (PRINIVIL,ZESTRIL) 10 MG tablet, Take 1 tablet by mouth Daily., Disp: 30 tablet, Rfl: 11  •  Magnesium Oxide -Mg Supplement 250 MG tablet, Take 1 tablet by mouth Daily., Disp: 90 tablet, Rfl: 3  •  nicotine polacrilex (NICORETTE) 2 MG gum, Chew 2 mg As Needed for Smoking Cessation., Disp: , Rfl:   •  omeprazole (priLOSEC) 20 MG capsule, Take 20 mg by mouth Daily., Disp: , Rfl:   •  potassium chloride (K-DUR) 10 MEQ CR tablet, Take 1 tablet by mouth Daily. Take with Lasix to prevent muscle cramps, Disp: 90 tablet, Rfl: 3    No Known Allergies     reports that he has been smoking cigarettes. He has a 12.50 pack-year smoking history. He has never used smokeless tobacco.    Family History   Problem Relation Age of Onset   • Arthritis Mother    • Arthritis Father    • Hypertension Father    • Heart failure Father    • Coronary artery disease Brother          Objective:   Physical exam:  /64 (BP Location: Left arm, Patient Position: Sitting)   Pulse 72   Ht 175.3 cm (69\")   Wt 88.5 kg (195 lb)   BMI 28.80 kg/m²   CONSTITUTIONAL: No acute distress  RESPIRATORY: Normal effort. Clear to auscultation bilaterally without wheezing or rales  CARDIOVASCULAR: Regular rate and rhythm with normal S1 and S2. Without murmur, gallop or rub.  PERIPHERAL VASCULAR: Normal radial pulse. There is no lower extremity edema bilaterally.  PSYCH: Normal affect and mood    Labs:    BUN   Date Value Ref Range Status   11/26/2019 16 8 - 23 mg/dL Final     Creatinine   Date Value Ref Range Status   11/26/2019 1.14 0.76 - 1.27 mg/dL Final     Potassium   Date Value Ref Range Status   11/26/2019 5.1 3.5 - 5.2 mmol/L Final     ALT (SGPT)   Date Value Ref Range " Status   11/18/2019 33 1 - 41 U/L Final     AST (SGOT)   Date Value Ref Range Status   11/18/2019 39 1 - 40 U/L Final       Lab Results   Component Value Date    CHOL 148 11/19/2019     Lab Results   Component Value Date    TRIG 115 11/19/2019     Lab Results   Component Value Date    HDL 36 (L) 11/19/2019     Lab Results   Component Value Date    LDL 89 11/19/2019     No components found for: LDLDIRECTC    Diagnostic Data:    Procedures    TTE 3/2020  · Left ventricular systolic function is moderately decreased. Estimated EF appears to be in the range of 36 - 40%.  · Left ventricular wall thickness is consistent with mild concentric hypertrophy.  · The left ventricular cavity is mildly dilated.  · There is left ventricular global hypokinesis noted.  · Left ventricular diastolic dysfunction (grade I a) consistent with impaired relaxation.  · Moderate mitral valve regurgitation is present    TTE 11/2019  · Left ventricular systolic function is moderately decreased. Estimated EF appears to be in the range of 36 - 40%.  · There is left ventricular global hypokinesis noted.  · Left ventricular diastolic dysfunction (grade III) consistent with reversible restrictive pattern.  · Right ventricular cavity is borderline dilated.  · Left atrial cavity size is severely dilated.  · Right atrial cavity size is moderately dilated.  · Moderate-to-severe mitral valve regurgitation is present  · Mild tricuspid valve regurgitation is present.  · Estimated right ventricular systolic pressure from tricuspid regurgitation is mildly elevated (35-45 mmHg).    Stress test 11/2019  · Left ventricular ejection fraction is moderately reduced (Calculated EF = 36%). The left ventricle was moderately dilated and had diffuse hypokinesis.  · Myocardial perfusion imaging indicates a normal myocardial perfusion study with no evidence of ischemia.  · There was coronary artery calcification noted in the territory of the LAD.  · Impressions are  consistent with an intermediate risk study.    Assessment and Plan:   Kb was seen today for chronic systolic heart failure (cms/hcc).    Diagnoses and all orders for this visit:    Chronic systolic heart failure (CMS/HCC)  Non-rheumatic mitral regurgitation  -NYHA functional class I-II  -Continue carvedilol, lisinopril, Lasix, and potassium.  -Repeat CMP    Tobacco dependence  -Ongoing tobacco use, previous attempts to quit.      - Return in about 6 months (around 4/5/2021).    Julee Schultz, CHARLI  10/05/20 16:36 EDT

## 2020-11-05 DIAGNOSIS — I50.9 ACUTE CONGESTIVE HEART FAILURE, UNSPECIFIED HEART FAILURE TYPE (HCC): ICD-10-CM

## 2020-11-05 DIAGNOSIS — R25.2 MUSCLE CRAMPS: ICD-10-CM

## 2020-11-05 RX ORDER — CARBOXYMETHYLCELLULOSE SODIUM 0.5 %
DROPPERETTE, SINGLE-USE DROP DISPENSER OPHTHALMIC (EYE)
Qty: 90 TABLET | Refills: 3 | Status: SHIPPED | OUTPATIENT
Start: 2020-11-05 | End: 2021-04-26

## 2021-04-26 ENCOUNTER — OFFICE VISIT (OUTPATIENT)
Dept: CARDIOLOGY | Facility: CLINIC | Age: 66
End: 2021-04-26

## 2021-04-26 VITALS
HEART RATE: 80 BPM | WEIGHT: 205 LBS | SYSTOLIC BLOOD PRESSURE: 110 MMHG | OXYGEN SATURATION: 95 % | HEIGHT: 69 IN | TEMPERATURE: 97.5 F | BODY MASS INDEX: 30.36 KG/M2 | DIASTOLIC BLOOD PRESSURE: 60 MMHG

## 2021-04-26 DIAGNOSIS — I34.0 NON-RHEUMATIC MITRAL REGURGITATION: ICD-10-CM

## 2021-04-26 DIAGNOSIS — I50.22 CHRONIC SYSTOLIC HEART FAILURE (HCC): Primary | ICD-10-CM

## 2021-04-26 PROCEDURE — 99213 OFFICE O/P EST LOW 20 MIN: CPT | Performed by: INTERNAL MEDICINE

## 2021-04-26 RX ORDER — MULTIVITAMIN WITH IRON
1 TABLET ORAL DAILY
COMMUNITY
Start: 2021-02-07 | End: 2021-11-09 | Stop reason: SDUPTHER

## 2021-04-26 RX ORDER — POTASSIUM CHLORIDE 750 MG/1
TABLET, FILM COATED, EXTENDED RELEASE ORAL
COMMUNITY
End: 2021-04-26

## 2021-04-26 NOTE — PROGRESS NOTES
[facility] AT [address], [address]  [place], [place], [address] [phone]  WWW.[facility]           OUTPATIENT CLINIC FOLLOW UP NOTE    Patient Care Team:  Patient Care Team:  Lonny Pugh DO as PCP - General (Family Medicine)    Subjective:      Chief Complaint   Patient presents with   • Chronic systolic heart failure       HPI:    Kb Walker is a 65 y.o. male.  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 Systolic heart failure, EF 36 to 40%, diagnosed 11/2019.  Negative stress test for evidence of ischemia 11/2019  2. Moderate to severe mitral regurgitation, diagnosed 11/2019  1. Noted to be moderate in 3/2020 after fluid volume status improved.  3. Tobacco dependence    The patient presents today for follow-up.    Since his last visit he has done well from a cardiac standpoint.  Denies exertional chest pain.  Denies shortness of air.  Denies lower extremity swelling or palpitations.    Review of Systems:  Negative for exertional chest pain, dyspnea with exertion, orthopnea, PND, lower extremity edema, palpitations, lightheadedness, syncope.     PFSH:  Patient Active Problem List   Diagnosis   • Acute respiratory failure with hypoxia (CMS/HCC)   • Personal history of tobacco use, presenting hazards to health   • Hyperglycemia   • Chronic systolic heart failure (CMS/HCC)   • Non-rheumatic mitral regurgitation   • Stage 2 moderate COPD by GOLD classification (CMS/HCC)   • Hand joint pain   • Pain in joint involving ankle and foot         Current Outpatient Medications:   •  acetaminophen (TYLENOL) 325 MG tablet, Take 650 mg by mouth Every 6 (Six) Hours As Needed for Mild Pain ., Disp: , Rfl:   •  albuterol sulfate  (90 Base) MCG/ACT inhaler, Inhale 2 puffs Every 4 (Four) Hours As Needed., Disp: , Rfl:   •  carvedilol (COREG) 12.5 MG tablet, Take 1 tablet by mouth 2 (Two) Times a Day., Disp: 180  "tablet, Rfl: 3  •  diphenhydrAMINE (BENADRYL) 50 MG tablet, Take 1 tablet by mouth At Night As Needed., Disp: , Rfl:   •  furosemide (Lasix) 40 MG tablet, Take one tablet daily., Disp: 90 tablet, Rfl: 3  •  lisinopril (PRINIVIL,ZESTRIL) 10 MG tablet, Take 1 tablet by mouth Daily., Disp: 90 tablet, Rfl: 3  •  Magnesium 250 MG tablet, Take 1 tablet by mouth Daily., Disp: , Rfl:   •  nicotine polacrilex (NICORETTE) 2 MG gum, Chew 2 mg As Needed for Smoking Cessation., Disp: , Rfl:   •  omeprazole (priLOSEC) 20 MG capsule, Take 20 mg by mouth Daily., Disp: , Rfl:   •  potassium chloride 10 MEQ CR tablet, Take 1 tablet by mouth Daily. Take with Lasix to prevent muscle cramps, Disp: 90 tablet, Rfl: 3    No Known Allergies     reports that he has been smoking cigarettes. He has a 12.50 pack-year smoking history. He has never used smokeless tobacco.    Family History   Problem Relation Age of Onset   • Arthritis Mother    • Arthritis Father    • Hypertension Father    • Heart failure Father    • Coronary artery disease Brother          Objective:   Physical exam:  /60 (BP Location: Left arm, Patient Position: Sitting)   Pulse 80   Temp 97.5 °F (36.4 °C)   Ht 175.3 cm (69\")   Wt 93 kg (205 lb)   SpO2 95%   BMI 30.27 kg/m²   CONSTITUTIONAL: No acute distress  RESPIRATORY: Normal effort. Clear to auscultation bilaterally without wheezing or rales  CARDIOVASCULAR: Regular rate and rhythm with normal S1 and S2. Without murmur, gallop or rub.  No carotid bruit bilaterally  PERIPHERAL VASCULAR: Normal radial pulse.   PSYCH: Normal affect and mood    Labs:    BUN   Date Value Ref Range Status   11/26/2019 16 8 - 23 mg/dL Final     Creatinine   Date Value Ref Range Status   11/26/2019 1.14 0.76 - 1.27 mg/dL Final     Potassium   Date Value Ref Range Status   11/26/2019 5.1 3.5 - 5.2 mmol/L Final     ALT (SGPT)   Date Value Ref Range Status   11/18/2019 33 1 - 41 U/L Final     AST (SGOT)   Date Value Ref Range Status "   11/18/2019 39 1 - 40 U/L Final       Lab Results   Component Value Date    CHOL 148 11/19/2019     Lab Results   Component Value Date    TRIG 115 11/19/2019     Lab Results   Component Value Date    HDL 36 (L) 11/19/2019     Lab Results   Component Value Date    LDL 89 11/19/2019     No components found for: LDLDIRECTC    Diagnostic Data:    Procedures    TTE 3/2020  · Left ventricular systolic function is moderately decreased. Estimated EF appears to be in the range of 36 - 40%.  · Left ventricular wall thickness is consistent with mild concentric hypertrophy.  · The left ventricular cavity is mildly dilated.  · There is left ventricular global hypokinesis noted.  · Left ventricular diastolic dysfunction (grade I a) consistent with impaired relaxation.  · Moderate mitral valve regurgitation is present    TTE 11/2019  · Left ventricular systolic function is moderately decreased. Estimated EF appears to be in the range of 36 - 40%.  · There is left ventricular global hypokinesis noted.  · Left ventricular diastolic dysfunction (grade III) consistent with reversible restrictive pattern.  · Right ventricular cavity is borderline dilated.  · Left atrial cavity size is severely dilated.  · Right atrial cavity size is moderately dilated.  · Moderate-to-severe mitral valve regurgitation is present  · Mild tricuspid valve regurgitation is present.  · Estimated right ventricular systolic pressure from tricuspid regurgitation is mildly elevated (35-45 mmHg).    Stress test 11/2019  · Left ventricular ejection fraction is moderately reduced (Calculated EF = 36%). The left ventricle was moderately dilated and had diffuse hypokinesis.  · Myocardial perfusion imaging indicates a normal myocardial perfusion study with no evidence of ischemia.  · There was coronary artery calcification noted in the territory of the LAD.  · Impressions are consistent with an intermediate risk study.    Assessment and Plan:   Kb was seen today  for chronic systolic heart failure (cms/hcc).    Diagnoses and all orders for this visit:    Chronic systolic heart failure (CMS/HCC)  Non-rheumatic mitral regurgitation  -NYHA functional class I  -Continue carvedilol, lisinopril, Lasix, and potassium.    Tobacco dependence  -Ongoing tobacco use, previous attempts to quit.      - Return in about 1 year (around 4/26/2022) for Next scheduled follow-up with an ECG.    Laurent Flores MD  04/26/21 16:12 EDT

## 2021-04-28 ENCOUNTER — OFFICE VISIT (OUTPATIENT)
Dept: FAMILY MEDICINE CLINIC | Facility: CLINIC | Age: 66
End: 2021-04-28

## 2021-04-28 VITALS
DIASTOLIC BLOOD PRESSURE: 60 MMHG | HEART RATE: 87 BPM | HEIGHT: 69 IN | WEIGHT: 203 LBS | OXYGEN SATURATION: 95 % | SYSTOLIC BLOOD PRESSURE: 102 MMHG | BODY MASS INDEX: 30.07 KG/M2

## 2021-04-28 DIAGNOSIS — R73.9 HYPERGLYCEMIA: ICD-10-CM

## 2021-04-28 DIAGNOSIS — G47.30 SLEEP APNEA, UNSPECIFIED TYPE: ICD-10-CM

## 2021-04-28 DIAGNOSIS — I50.22 CHRONIC SYSTOLIC HEART FAILURE (HCC): ICD-10-CM

## 2021-04-28 DIAGNOSIS — Z12.11 COLON CANCER SCREENING: ICD-10-CM

## 2021-04-28 DIAGNOSIS — Z12.5 SCREENING PSA (PROSTATE SPECIFIC ANTIGEN): ICD-10-CM

## 2021-04-28 DIAGNOSIS — J44.9 STAGE 2 MODERATE COPD BY GOLD CLASSIFICATION (HCC): ICD-10-CM

## 2021-04-28 DIAGNOSIS — Z00.00 PREVENTATIVE HEALTH CARE: ICD-10-CM

## 2021-04-28 DIAGNOSIS — Z00.00 MEDICARE ANNUAL WELLNESS VISIT, SUBSEQUENT: Primary | ICD-10-CM

## 2021-04-28 DIAGNOSIS — Z87.891 PERSONAL HISTORY OF TOBACCO USE, PRESENTING HAZARDS TO HEALTH: ICD-10-CM

## 2021-04-28 PROBLEM — J96.01 ACUTE RESPIRATORY FAILURE WITH HYPOXIA (HCC): Status: RESOLVED | Noted: 2019-11-18 | Resolved: 2021-04-28

## 2021-04-28 PROCEDURE — G0439 PPPS, SUBSEQ VISIT: HCPCS | Performed by: FAMILY MEDICINE

## 2021-04-28 PROCEDURE — 99397 PER PM REEVAL EST PAT 65+ YR: CPT | Performed by: FAMILY MEDICINE

## 2021-11-09 DIAGNOSIS — R25.2 MUSCLE CRAMPS: ICD-10-CM

## 2021-11-09 DIAGNOSIS — I50.9 ACUTE CONGESTIVE HEART FAILURE, UNSPECIFIED HEART FAILURE TYPE (HCC): ICD-10-CM

## 2021-11-09 RX ORDER — POTASSIUM CHLORIDE 750 MG/1
10 TABLET, FILM COATED, EXTENDED RELEASE ORAL DAILY
Qty: 90 TABLET | Refills: 3 | Status: SHIPPED | OUTPATIENT
Start: 2021-11-09 | End: 2022-06-16

## 2021-11-09 RX ORDER — LISINOPRIL 10 MG/1
TABLET ORAL
Qty: 90 TABLET | Refills: 3 | Status: SHIPPED | OUTPATIENT
Start: 2021-11-09 | End: 2022-11-10

## 2021-11-09 RX ORDER — CARBOXYMETHYLCELLULOSE SODIUM 0.5 %
DROPPERETTE, SINGLE-USE DROP DISPENSER OPHTHALMIC (EYE)
Qty: 90 TABLET | Refills: 3 | Status: SHIPPED | OUTPATIENT
Start: 2021-11-09

## 2021-11-09 RX ORDER — FUROSEMIDE 40 MG/1
TABLET ORAL
Qty: 90 TABLET | Refills: 3 | Status: SHIPPED | OUTPATIENT
Start: 2021-11-09 | End: 2022-06-16

## 2021-11-09 NOTE — TELEPHONE ENCOUNTER
Rx Refill Note  Requested Prescriptions     Pending Prescriptions Disp Refills   • [facility] Magnesium Oxide 250 MG tablet [Pharmacy Med Name: [facility] MAGNESIUM 250 MG CAPLET] 90 tablet 3     Sig: TAKE 1 TABLET BY MOUTH EVERY DAY      Last office visit with prescribing clinician: 4/28/2021      Next office visit with prescribing clinician: 5/4/2022   23}  Randi Avalos MA  11/09/21, 08:16 EST     The original prescription was discontinued on 4/26/2021 by Graciela Stratton MA for the following reason:

## 2021-11-15 RX ORDER — CARVEDILOL 12.5 MG/1
12.5 TABLET ORAL 2 TIMES DAILY
Qty: 180 TABLET | Refills: 3 | Status: SHIPPED | OUTPATIENT
Start: 2021-11-15 | End: 2022-11-10

## 2022-05-04 ENCOUNTER — OFFICE VISIT (OUTPATIENT)
Dept: FAMILY MEDICINE CLINIC | Facility: CLINIC | Age: 67
End: 2022-05-04

## 2022-05-04 ENCOUNTER — LAB (OUTPATIENT)
Dept: LAB | Facility: HOSPITAL | Age: 67
End: 2022-05-04

## 2022-05-04 VITALS
HEART RATE: 68 BPM | BODY MASS INDEX: 29.47 KG/M2 | WEIGHT: 199 LBS | OXYGEN SATURATION: 95 % | RESPIRATION RATE: 16 BRPM | SYSTOLIC BLOOD PRESSURE: 112 MMHG | HEIGHT: 69 IN | DIASTOLIC BLOOD PRESSURE: 76 MMHG

## 2022-05-04 DIAGNOSIS — Z12.5 SCREENING PSA (PROSTATE SPECIFIC ANTIGEN): ICD-10-CM

## 2022-05-04 DIAGNOSIS — Z71.89 ADVANCE CARE PLANNING: ICD-10-CM

## 2022-05-04 DIAGNOSIS — Z12.11 COLON CANCER SCREENING: ICD-10-CM

## 2022-05-04 DIAGNOSIS — R73.9 HYPERGLYCEMIA: ICD-10-CM

## 2022-05-04 DIAGNOSIS — I10 ESSENTIAL HYPERTENSION: ICD-10-CM

## 2022-05-04 DIAGNOSIS — I50.22 CHRONIC SYSTOLIC HEART FAILURE: ICD-10-CM

## 2022-05-04 DIAGNOSIS — Z00.00 MEDICARE ANNUAL WELLNESS VISIT, SUBSEQUENT: Primary | ICD-10-CM

## 2022-05-04 DIAGNOSIS — Z13.29 SCREENING FOR ENDOCRINE DISORDER: ICD-10-CM

## 2022-05-04 DIAGNOSIS — J44.9 STAGE 2 MODERATE COPD BY GOLD CLASSIFICATION: ICD-10-CM

## 2022-05-04 DIAGNOSIS — Z13.0 SCREENING FOR DEFICIENCY ANEMIA: ICD-10-CM

## 2022-05-04 DIAGNOSIS — Z13.220 SCREENING FOR HYPERLIPIDEMIA: ICD-10-CM

## 2022-05-04 LAB
ALBUMIN SERPL-MCNC: 4.5 G/DL (ref 3.5–5.2)
ALBUMIN/GLOB SERPL: 1.5 G/DL
ALP SERPL-CCNC: 84 U/L (ref 39–117)
ALT SERPL W P-5'-P-CCNC: 19 U/L (ref 1–41)
ANION GAP SERPL CALCULATED.3IONS-SCNC: 9.9 MMOL/L (ref 5–15)
AST SERPL-CCNC: 17 U/L (ref 1–40)
BILIRUB SERPL-MCNC: 0.3 MG/DL (ref 0–1.2)
BILIRUB UR QL STRIP: NEGATIVE
BUN SERPL-MCNC: 20 MG/DL (ref 8–23)
BUN/CREAT SERPL: 16 (ref 7–25)
CALCIUM SPEC-SCNC: 9.9 MG/DL (ref 8.6–10.5)
CHLORIDE SERPL-SCNC: 103 MMOL/L (ref 98–107)
CHOLEST SERPL-MCNC: 223 MG/DL (ref 0–200)
CLARITY UR: CLEAR
CO2 SERPL-SCNC: 26.1 MMOL/L (ref 22–29)
COLOR UR: YELLOW
CREAT SERPL-MCNC: 1.25 MG/DL (ref 0.76–1.27)
DEPRECATED RDW RBC AUTO: 44.3 FL (ref 37–54)
EGFRCR SERPLBLD CKD-EPI 2021: 62.7 ML/MIN/1.73
ERYTHROCYTE [DISTWIDTH] IN BLOOD BY AUTOMATED COUNT: 13.1 % (ref 12.3–15.4)
GLOBULIN UR ELPH-MCNC: 3.1 GM/DL
GLUCOSE SERPL-MCNC: 97 MG/DL (ref 65–99)
GLUCOSE UR STRIP-MCNC: NEGATIVE MG/DL
HBA1C MFR BLD: 6 % (ref 4.8–5.6)
HCT VFR BLD AUTO: 45.9 % (ref 37.5–51)
HCV AB SER DONR QL: NORMAL
HDLC SERPL-MCNC: 35 MG/DL (ref 40–60)
HGB BLD-MCNC: 15.8 G/DL (ref 13–17.7)
HGB UR QL STRIP.AUTO: NEGATIVE
KETONES UR QL STRIP: NEGATIVE
LDLC SERPL CALC-MCNC: 131 MG/DL (ref 0–100)
LDLC/HDLC SERPL: 3.57 {RATIO}
LEUKOCYTE ESTERASE UR QL STRIP.AUTO: NEGATIVE
MCH RBC QN AUTO: 32.2 PG (ref 26.6–33)
MCHC RBC AUTO-ENTMCNC: 34.4 G/DL (ref 31.5–35.7)
MCV RBC AUTO: 93.5 FL (ref 79–97)
NITRITE UR QL STRIP: NEGATIVE
PH UR STRIP.AUTO: 6 [PH] (ref 5–8)
PLATELET # BLD AUTO: 190 10*3/MM3 (ref 140–450)
PMV BLD AUTO: 10.7 FL (ref 6–12)
POTASSIUM SERPL-SCNC: 5.1 MMOL/L (ref 3.5–5.2)
PROT SERPL-MCNC: 7.6 G/DL (ref 6–8.5)
PROT UR QL STRIP: NEGATIVE
PSA SERPL-MCNC: 1.02 NG/ML (ref 0–4)
RBC # BLD AUTO: 4.91 10*6/MM3 (ref 4.14–5.8)
SODIUM SERPL-SCNC: 139 MMOL/L (ref 136–145)
SP GR UR STRIP: 1.01 (ref 1–1.03)
TRIGL SERPL-MCNC: 315 MG/DL (ref 0–150)
TSH SERPL DL<=0.05 MIU/L-ACNC: 1.05 UIU/ML (ref 0.27–4.2)
UROBILINOGEN UR QL STRIP: NORMAL
VLDLC SERPL-MCNC: 57 MG/DL (ref 5–40)
WBC NRBC COR # BLD: 7.9 10*3/MM3 (ref 3.4–10.8)

## 2022-05-04 PROCEDURE — 81003 URINALYSIS AUTO W/O SCOPE: CPT | Performed by: FAMILY MEDICINE

## 2022-05-04 PROCEDURE — 83036 HEMOGLOBIN GLYCOSYLATED A1C: CPT | Performed by: FAMILY MEDICINE

## 2022-05-04 PROCEDURE — G0439 PPPS, SUBSEQ VISIT: HCPCS | Performed by: FAMILY MEDICINE

## 2022-05-04 PROCEDURE — 1170F FXNL STATUS ASSESSED: CPT | Performed by: FAMILY MEDICINE

## 2022-05-04 PROCEDURE — 1126F AMNT PAIN NOTED NONE PRSNT: CPT | Performed by: FAMILY MEDICINE

## 2022-05-04 PROCEDURE — 1159F MED LIST DOCD IN RCRD: CPT | Performed by: FAMILY MEDICINE

## 2022-05-04 PROCEDURE — 85027 COMPLETE CBC AUTOMATED: CPT | Performed by: FAMILY MEDICINE

## 2022-05-04 PROCEDURE — 80061 LIPID PANEL: CPT | Performed by: FAMILY MEDICINE

## 2022-05-04 PROCEDURE — 80053 COMPREHEN METABOLIC PANEL: CPT | Performed by: FAMILY MEDICINE

## 2022-05-04 PROCEDURE — 84443 ASSAY THYROID STIM HORMONE: CPT | Performed by: FAMILY MEDICINE

## 2022-05-04 PROCEDURE — G0103 PSA SCREENING: HCPCS | Performed by: FAMILY MEDICINE

## 2022-05-04 PROCEDURE — 86803 HEPATITIS C AB TEST: CPT | Performed by: FAMILY MEDICINE

## 2022-05-04 NOTE — PROGRESS NOTES
The ABCs of the Annual Wellness Visit  Subsequent Medicare Wellness Visit    Chief Complaint   Patient presents with   • Medicare Wellness-subsequent      Subjective    History of Present Illness:  Kb Walker is a 66 y.o. male who presents for a Subsequent Medicare Wellness Visit.    The patient reports that he is not having any problems in the last year. He states that he uses a hearing aid, but he did not bring it with him. He states that he can hear pretty well as long as he is not walking away from me or there is a lot of interfering sounds. He states that he needs to be able to hear better. He states that he will use the hearing aid, which is a cheap job from [employer], and it works okay. He states that he does not like using it because it is just a pain. He states that if he is around a bunch of kids, they get screaming, yelling, and carrying on, then it is too loud. The patient states it is not a real good one but it works. He states that most of the time he can hear okay.    He states that his health is doing about the same compared to last year. He states that he was taking Lasix last year, and he quit taking the Lasix because he had lost a lot of weight, and he has not gained any weight back. He states that he has not taken Lasix for at least 6 months. He states that he is still taking the carvedilol, lisinopril, and ibuprofen. He states that he has been taking the potassium.    He states that he did not use the Cologuard done last year.    He states that his breathing is fine, and he is sleeping okay. He states that after the pneumonia cleared up, he began to be able to sleep better.    He states that he is still smoking. 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    He states that he does not have an advanced directive or a living will. He states that he thinks he might have done one when he was in the hospital, but he is not absolutely sure about it.    The following portions of the  patient's history were reviewed and   updated as appropriate: allergies, current medications, past family history, past medical history, past social history, past surgical history and problem list.    Compared to one year ago, the patient feels his physical   health is the same.    Compared to one year ago, the patient feels his mental   health is the same.    Recent Hospitalizations:  He was not admitted to the hospital during the last year.       Current Medical Providers:  Patient Care Team:  Lonny Pugh DO as PCP - General (Family Medicine)    Outpatient Medications Prior to Visit   Medication Sig Dispense Refill   • acetaminophen (TYLENOL) 325 MG tablet Take 650 mg by mouth Every 6 (Six) Hours As Needed for Mild Pain .     • albuterol sulfate  (90 Base) MCG/ACT inhaler Inhale 2 puffs Every 4 (Four) Hours As Needed.     • carvedilol (COREG) 12.5 MG tablet Take 1 tablet by mouth 2 (Two) Times a Day. 180 tablet 3   • [facility] Magnesium Oxide 250 MG tablet TAKE 1 TABLET BY MOUTH EVERY DAY 90 tablet 3   • diphenhydrAMINE (BENADRYL) 50 MG tablet Take 1 tablet by mouth At Night As Needed.     • furosemide (LASIX) 40 MG tablet TAKE 1 TABLET BY MOUTH EVERY DAY 90 tablet 3   • lisinopril (PRINIVIL,ZESTRIL) 10 MG tablet TAKE 1 TABLET BY MOUTH EVERY DAY 90 tablet 3   • nicotine polacrilex (NICORETTE) 2 MG gum Chew 2 mg As Needed for Smoking Cessation.     • omeprazole (priLOSEC) 20 MG capsule Take 20 mg by mouth Daily.     • potassium chloride 10 MEQ CR tablet TAKE 1 TABLET BY MOUTH DAILY. TAKE WITH LASIX TO PREVENT MUSCLE CRAMPS 90 tablet 3     No facility-administered medications prior to visit.       No opioid medication identified on active medication list. I have reviewed chart for other potential  high risk medication/s and harmful drug interactions in the elderly.          Aspirin is not on active medication list.  Aspirin use is indicated based on review of current medical condition/s. Pros and cons of  "this therapy have been discussed with this patient. Benefits of this medication outweigh potential harm.  Patient has been instructed to start taking this medication..    Patient Active Problem List   Diagnosis   • Personal history of tobacco use, presenting hazards to health   • Hyperglycemia   • Chronic systolic heart failure (HCC)   • Non-rheumatic mitral regurgitation   • Stage 2 moderate COPD by GOLD classification (HCC)     Advance Care Planning  Advance Directive is not on file.  ACP discussion was held with the patient during this visit. Patient does not have an advance directive, declines further assistance.          Objective    Vitals:    22 1147   BP: 112/76   Pulse: 68   Resp: 16   SpO2: 95%   Weight: 90.3 kg (199 lb)   Height: 175.3 cm (69.02\")   PainSc: 0-No pain     BMI Readings from Last 1 Encounters:   22 29.37 kg/m²   BMI is above normal parameters. Recommendations include: exercise counseling and nutrition counseling    Does the patient have evidence of cognitive impairment? Yes  ATTENTION  What is the year: correct  What is the month of the year: correct  What is the day of the week?: correct  What is the date?: correct  MEMORY  Repeat address three times, only score third attempt: [address] [place], [place]: 7  HOW MANY ANIMALS DID THE PATIENT NAME  Verbal Fluency -- Animal Names (0-25): 22+  CLOCK DRAWING  Clock Drawing: Incorrect  MEMORY RECALL  Tell me what you remember about that name and address we were repeating at the beginnin  ACE TOTAL SCORE  Total ACE Score - <25/30 strongly suggests cognitive impairment; <21/30 almost certainly shows dementia: 23      Physical Exam  Lab Results   Component Value Date    TRIG 315 (H) 2022    HDL 35 (L) 2022     (H) 2022    VLDL 57 (H) 2022    HGBA1C 6.00 (H) 2022            HEALTH RISK ASSESSMENT    Smoking Status:  Social History     Tobacco Use   Smoking Status Current Every " Day Smoker   • Packs/day: 0.25   • Years: 50.00   • Pack years: 12.50   • Types: Cigarettes   Smokeless Tobacco Never Used   Tobacco Comment    placed the patch 11/27/2019     Alcohol Consumption:  Social History     Substance and Sexual Activity   Alcohol Use Not Currently    Comment: occasional     Fall Risk Screen:    BERTHA Fall Risk Assessment was completed, and patient is at LOW risk for falls.Assessment completed on:5/4/2022    Depression Screening:  PHQ-2/PHQ-9 Depression Screening 5/4/2022   Retired PHQ-9 Total Score -   Retired Total Score -   Little Interest or Pleasure in Doing Things 0-->not at all   Feeling Down, Depressed or Hopeless 0-->not at all   Trouble Falling or Staying Asleep, or Sleeping Too Much 0-->not at all   Feeling Tired or Having Little Energy 0-->not at all   Poor Appetite or Overeating 0-->not at all   Feeling Bad about Yourself - or that You are a Failure or Have Let Yourself or Your Family Down 0-->not at all   Trouble Concentrating on Things, Such as Reading the Newspaper or Watching Television 0-->not at all   Moving or Speaking So Slowly that Other People Could Have Noticed? Or the Opposite - Being So Fidgety 0-->not at all   Thoughts that You Would be Better Off Dead or of Hurting Yourself in Some Way 0-->not at all   PHQ-9: Brief Depression Severity Measure Score 0   If You Checked Off Any Problems, How Difficult Have These Problems Made It For You to Do Your Work, Take Care of Things at Home, or Get Along with Other People? not difficult at all       Health Habits and Functional and Cognitive Screening:  Functional & Cognitive Status 5/4/2022   Do you have difficulty preparing food and eating? No   Do you have difficulty bathing yourself, getting dressed or grooming yourself? No   Do you have difficulty using the toilet? No   Do you have difficulty moving around from place to place? No   Do you have trouble with steps or getting out of a bed or a chair? No   Current Diet Well  Balanced Diet   Dental Exam Up to date   Eye Exam Up to date   Exercise (times per week) 7 times per week   Current Exercises Include Walking        Exercise Comment farm work   Current Exercise Activities Include -   Do you need help using the phone?  No   Are you deaf or do you have serious difficulty hearing?  Yes   Do you need help with transportation? No   Do you need help shopping? No   Do you need help preparing meals?  No   Do you need help with housework?  No   Do you need help with laundry? No   Do you need help taking your medications? No   Do you need help managing money? No   Do you ever drive or ride in a car without wearing a seat belt? Yes   Have you felt unusual stress, anger or loneliness in the last month? No   Who do you live with? Other   If you need help, do you have trouble finding someone available to you? No   Have you been bothered in the last four weeks by sexual problems? No   Do you have difficulty concentrating, remembering or making decisions? No       Age-appropriate Screening Schedule:  Refer to the list below for future screening recommendations based on patient's age, sex and/or medical conditions. Orders for these recommended tests are listed in the plan section. The patient has been provided with a written plan.    Health Maintenance   Topic Date Due   • TDAP/TD VACCINES (1 - Tdap) Never done   • ZOSTER VACCINE (1 of 2) Never done   • INFLUENZA VACCINE  08/01/2022              Assessment & Plan   CMS Preventative Services Quick Reference  Risk Factors Identified During Encounter  Cardiovascular Disease  Hearing Problem  Obesity/Overweight   Tobacco Use/Dependance (use dotphrase .tobaccocessation for documentation)  The above risks/problems have been discussed with the patient.  Follow up actions/plans if indicated are seen below in the Assessment/Plan Section.  Pertinent information has been shared with the patient in the After Visit Summary.    Diagnoses and all orders for this  visit:    1. Medicare annual wellness visit, subsequent (Primary)  Comments:  The patient is doing well overall. He will continue his current medications.  Orders:  -     Hepatitis C Antibody; Future  -     Hepatitis C Antibody    2. Screening for deficiency anemia  -     CBC (No Diff); Future  -     CBC (No Diff)    3. Screening for endocrine disorder  -     Comprehensive Metabolic Panel; Future  -     Urinalysis With Microscopic If Indicated (No Culture) - Urine, Clean Catch; Future  -     TSH; Future  -     Comprehensive Metabolic Panel  -     Urinalysis With Microscopic If Indicated (No Culture) - Urine, Clean Catch  -     TSH    4. Screening for hyperlipidemia  -     Lipid Panel; Future  -     Lipid Panel    5. Screening PSA (prostate specific antigen)  -     PSA Screen; Future  -     PSA Screen    6. Hyperglycemia  -     Hemoglobin A1c; Future  -     Hemoglobin A1c    7. Chronic systolic heart failure (HCC)    8. Stage 2 moderate COPD by GOLD classification ([facility])    9. Colon cancer screening  -     Cologuard - Stool, Per Rectum; Future    10. Advance care planning  -     Ambulatory Referral to Advance Care Planning    11. Essential hypertension  Comments:  His blood pressure is well controlled today. He will continue his current medications.      Problem List Items Addressed This Visit        Cardiac and Vasculature    Chronic systolic heart failure (HCC)    Overview     Echo 3/2/20  · Left ventricular systolic function is moderately decreased. Estimated EF appears to be in the range of 36 - 40%.  · Left ventricular wall thickness is consistent with mild concentric hypertrophy.  · The left ventricular cavity is mildly dilated.  · There is left ventricular global hypokinesis noted.  · Left ventricular diastolic dysfunction (grade I a) consistent with impaired relaxation.  · Moderate mitral valve regurgitation is present              Endocrine and Metabolic    Hyperglycemia    Relevant Orders    Hemoglobin A1c  (Completed)       Pulmonary and Pneumonias    Stage 2 moderate COPD by GOLD classification (HCC)      Other Visit Diagnoses     Medicare annual wellness visit, subsequent    -  Primary    The patient is doing well overall. He will continue his current medications.    Relevant Orders    Hepatitis C Antibody (Completed)    Screening for deficiency anemia        Relevant Orders    CBC (No Diff) (Completed)    Screening for endocrine disorder        Relevant Orders    Comprehensive Metabolic Panel (Completed)    Urinalysis With Microscopic If Indicated (No Culture) - Urine, Clean Catch (Completed)    TSH (Completed)    Screening for hyperlipidemia        Relevant Orders    Lipid Panel (Completed)    Screening PSA (prostate specific antigen)        Relevant Orders    PSA Screen (Completed)    Colon cancer screening        Relevant Orders    Cologuard - Stool, Per Rectum    Advance care planning        Relevant Orders    Ambulatory Referral to Advance Care Planning    Essential hypertension        His blood pressure is well controlled today. He will continue his current medications.        Hyperlipidemia, unspecified hyperlipidemia type        -  He will continue his current medications.    Obstructive sleep apnea        -  He will continue to use his CPAP machine.    Gastroesophageal reflux disease, unspecified whether esophagitis present        -  He will continue his current medication.      Follow Up:   Return in about 1 year (around 5/4/2023) for Medicare Wellness.     An After Visit Summary and PPPS were made available to the patient.        Transcribed from ambient dictation for Lonny Pugh DO by MILO POWERS.  05/04/22   13:09 EDT    Patient verbalized consent to the visit recording.

## 2022-05-04 NOTE — PATIENT INSTRUCTIONS
Consider [employer] hearing aids    Advance Care Planning and Advance Directives     You make decisions on a daily basis - decisions about where you want to live, your career, your home, your life. Perhaps one of the most important decisions you face is your choice for future medical care. Take time to talk with your family and your healthcare team and start planning today.  Advance Care Planning is a process that can help you:  Understand possible future healthcare decisions in light of your own experiences  Reflect on those decision in light of your goals and values  Discuss your decisions with those closest to you and the healthcare professionals that care for you  Make a plan by creating a document that reflects your wishes    Surrogate Decision Maker  In the event of a medical emergency, which has left you unable to communicate or to make your own decisions, you would need someone to make decisions for you.  It is important to discuss your preferences for medical treatment with this person while you are in good health.     Qualities of a surrogate decision maker:  Willing to take on this role and responsibility  Knows what you want for future medical care  Willing to follow your wishes even if they don't agree with them  Able to make difficult medical decisions under stressful circumstances    Advance Directives  These are legal documents you can create that will guide your healthcare team and decision maker(s) when needed. These documents can be stored in the electronic medical record.    Living Will - a legal document to guide your care if you have a terminal condition or a serious illness and are unable to communicate. States vary by statute in document names/types, but most forms may include one or more of the following:        -  Directions regarding life-prolonging treatments        -  Directions regarding artificially provided nutrition/hydration        -  Choosing a healthcare decision maker        -   Direction regarding organ/tissue donation    Durable Power of  for Healthcare - this document names an -in-fact to make medical decisions for you, but it may also allow this person to make personal and financial decisions for you. Please seek the advice of an  if you need this type of document.    **Advance Directives are not required and no one may discriminate against you if you do not sign one.    Medical Orders  Many states allow specific forms/orders signed by your physician to record your wishes for medical treatment in your current state of health. This form, signed in personal communication with your physician, addresses resuscitation and other medical interventions that you may or may not want.      For more information or to schedule a time with a [facility] Advance Care Planning Facilitator contact: [facility] or call [phone] and someone will contact you directly.  You are due for adacel Tdap vaccination. (provides protection against tetanus, diptheria and whooping cough) Please  get the immunization at your local pharmacy at your earliest convenience. This immunization will next be due in 10 years. Please click on the link for more information about this vaccine.    [facility] Tdap Vaccine Information    You are due for Shingrix vaccination series ( the newest shingles vaccine).  It is a two shot series spaced 2-6 months apart. Please get this vaccine series started at your earliest convenience at your local pharmacy to help avoid shingles outbreak. It is more effective than the old Zostavax vaccine and is recommended even if you have had the Zostavax vaccine in the past.  Once the Shingrix series is completed, it does not need to be repeated.   For more information, please look at the website below:  [facility] Shingrix Vaccine Information      Medicare Wellness  Personal Prevention Plan of Service     Date of Office Visit:    Encounter Provider:  Lonny Pugh  DO  Place of Service:  [facility]  Patient Name: Kb Walker  :  1955    As part of the Medicare Wellness portion of your visit today, we are providing you with this personalized preventive plan of services (PPPS). This plan is based upon recommendations of the [place] Preventive Services Task Force (USPSTF) and the Advisory Committee on Immunization Practices (ACIP).    This lists the preventive care services that should be considered, and provides dates of when you are due. Items listed as completed are up-to-date and do not require any further intervention.    Health Maintenance   Topic Date Due    Pneumococcal Vaccine 65+ (1 - PCV) Never done    TDAP/TD VACCINES (1 - Tdap) Never done    ZOSTER VACCINE (1 of 2) Never done    HEPATITIS C SCREENING  Never done    ANNUAL WELLNESS VISIT  2022    COVID-19 Vaccine (1) 2023 (Originally 10/18/1960)    INFLUENZA VACCINE  2022    AAA SCREEN (ONE-TIME)  Completed    COLORECTAL CANCER SCREENING  Completed       No orders of the defined types were placed in this encounter.      No follow-ups on file.

## 2022-06-01 ENCOUNTER — OFFICE VISIT (OUTPATIENT)
Dept: FAMILY MEDICINE CLINIC | Facility: CLINIC | Age: 67
End: 2022-06-01

## 2022-06-01 ENCOUNTER — LAB (OUTPATIENT)
Dept: LAB | Facility: HOSPITAL | Age: 67
End: 2022-06-01

## 2022-06-01 VITALS
HEART RATE: 87 BPM | SYSTOLIC BLOOD PRESSURE: 130 MMHG | BODY MASS INDEX: 29.47 KG/M2 | WEIGHT: 199 LBS | HEIGHT: 69 IN | TEMPERATURE: 97.7 F | OXYGEN SATURATION: 98 % | RESPIRATION RATE: 18 BRPM | DIASTOLIC BLOOD PRESSURE: 70 MMHG

## 2022-06-01 DIAGNOSIS — R19.7 DIARRHEA, UNSPECIFIED TYPE: ICD-10-CM

## 2022-06-01 DIAGNOSIS — R19.7 DIARRHEA, UNSPECIFIED TYPE: Primary | ICD-10-CM

## 2022-06-01 PROCEDURE — 99213 OFFICE O/P EST LOW 20 MIN: CPT | Performed by: NURSE PRACTITIONER

## 2022-06-01 PROCEDURE — 80053 COMPREHEN METABOLIC PANEL: CPT

## 2022-06-01 NOTE — PROGRESS NOTES
"Chief Complaint  Abdominal Pain and Diarrhea    Subjective        Kb Walker presents to [facility]  Pt is here with concerns of diarrhea and abdominal pain that has been ongoing since Saturday (4 days). Reports pain is in left upper quadrant that occurs with cough. Pain is described as discomfort, but not severe. States he has not eaten much in last few days. He has taken imodium, which does not seem to help. States he was feeling better yesterday evening. Symptoms have not been severe enough to keep him from working. He ate [employer] yesterday and states that caused the pain to be worse.     Wife is present for visit. She states that these symptoms are a frequent occurrence for patient. She feels like pt experiences diarrhea every 3-4 weeks. Pt states he feels these symptoms occur 3-4 times a year. Pt has never had a colonoscopy, and does not want to commit to the procedure. I advised that I will put a referral in to GI due to the frequency of symptoms.     Abdominal Pain  This is a recurrent problem. The current episode started in the past 7 days. The problem occurs intermittently. The problem has been rapidly improving. Quality: \"discomfort\" Associated symptoms include diarrhea. The pain is aggravated by eating. Treatments tried: Imodium. The treatment provided no relief.   Diarrhea   Associated symptoms include abdominal pain.     Objective   Vital Signs:  /70   Pulse 87   Temp 97.7 °F (36.5 °C) (Oral)   Resp 18   Ht 175.3 cm (69\")   Wt 90.3 kg (199 lb)   SpO2 98%   BMI 29.39 kg/m²         Physical Exam  Vitals reviewed.   Constitutional:       Appearance: Normal appearance.   HENT:      Nose: Nose normal.      Mouth/Throat:      Mouth: Mucous membranes are moist.   Cardiovascular:      Rate and Rhythm: Normal rate and regular rhythm.   Pulmonary:      Effort: Pulmonary effort is normal.      Breath sounds: Normal breath sounds.   Abdominal:      General: Abdomen is " flat. Bowel sounds are normal.      Palpations: Abdomen is soft.      Tenderness: There is no abdominal tenderness. There is no guarding or rebound.   Musculoskeletal:         General: Normal range of motion.   Skin:     General: Skin is warm.   Neurological:      Mental Status: He is alert and oriented to person, place, and time.   Psychiatric:         Mood and Affect: Mood normal.         Behavior: Behavior normal.        Result Review :                Assessment and Plan   Diagnoses and all orders for this visit:    1. Diarrhea, unspecified type (Primary)  -     Ambulatory Referral to Gastroenterology  -     Comprehensive Metabolic Panel; Future           Follow Up   No follow-ups on file.  Patient was given instructions and counseling regarding his condition or for health maintenance advice. Please see specific information pulled into the AVS if appropriate.

## 2022-06-02 LAB
ALBUMIN SERPL-MCNC: 4.6 G/DL (ref 3.5–5.2)
ALBUMIN/GLOB SERPL: 1.5 G/DL
ALP SERPL-CCNC: 71 U/L (ref 39–117)
ALT SERPL W P-5'-P-CCNC: 18 U/L (ref 1–41)
ANION GAP SERPL CALCULATED.3IONS-SCNC: 14.6 MMOL/L (ref 5–15)
AST SERPL-CCNC: 19 U/L (ref 1–40)
BILIRUB SERPL-MCNC: 0.4 MG/DL (ref 0–1.2)
BUN SERPL-MCNC: 26 MG/DL (ref 8–23)
BUN/CREAT SERPL: 16.1 (ref 7–25)
CALCIUM SPEC-SCNC: 9.4 MG/DL (ref 8.6–10.5)
CHLORIDE SERPL-SCNC: 100 MMOL/L (ref 98–107)
CO2 SERPL-SCNC: 22.4 MMOL/L (ref 22–29)
CREAT SERPL-MCNC: 1.61 MG/DL (ref 0.76–1.27)
EGFRCR SERPLBLD CKD-EPI 2021: 46.3 ML/MIN/1.73
GLOBULIN UR ELPH-MCNC: 3.1 GM/DL
GLUCOSE SERPL-MCNC: 107 MG/DL (ref 65–99)
POTASSIUM SERPL-SCNC: 4.6 MMOL/L (ref 3.5–5.2)
PROT SERPL-MCNC: 7.7 G/DL (ref 6–8.5)
SODIUM SERPL-SCNC: 137 MMOL/L (ref 136–145)

## 2022-06-03 ENCOUNTER — TELEPHONE (OUTPATIENT)
Dept: FAMILY MEDICINE CLINIC | Facility: CLINIC | Age: 67
End: 2022-06-03

## 2022-06-03 NOTE — TELEPHONE ENCOUNTER
"Attempted to contact patient and patient's wife to inform the following as ordered by provider. No answer; Left voicemail for patient to return call with office number given.    \"If he is still having diarrhea after 6 days, my professional opinion is that he needs to be evaluated in the ER. He likely needs IV hydration at this point. They will also be able to evaluate him to determine if scans needs to be done.\"     "

## 2022-06-03 NOTE — TELEPHONE ENCOUNTER
Called and spoke to patient's wife regarding lab results. Voiced understanding. Asked if WBC were checked or if they could be to see if this could be some kind of infection. Made appointment for June 13th with primary provider but asked what can be done if it is in fact acute changes. Stated patient is miserable and has been having diarrhea for 6 days now. Please advise, thanks    ----- Message from CHARLI Hennessy sent at 6/2/2022  4:04 PM EDT -----  I have received the results of your labs. Your kidney function has declined since your last labs a month ago. This is likely an acute change, but I would like for you to follow up with Dr. Pugh.

## 2022-06-03 NOTE — TELEPHONE ENCOUNTER
Contacted patient's wife relayed message. She states patient is still very sick and she agreed to follow medical advice if he does not improve.

## 2022-06-06 ENCOUNTER — TELEPHONE (OUTPATIENT)
Dept: CARDIOLOGY | Facility: CLINIC | Age: 67
End: 2022-06-06

## 2022-06-06 NOTE — TELEPHONE ENCOUNTER
Caller: Lety Lyons    Relationship: Emergency Contact    Best call back number: [phone]    What is the best time to reach you: ANY    Who are you requesting to speak with (clinical staff, provider,  specific staff member):  OR STAFF     Do you know the name of the person who called: LETY LYONS    What was the call regarding: PATIENTS WIFE CALLED IN TO SCHEDULE 1 YEAR FOLLOW UP FOR PATIENT BUT COULD NOT FIND ANY AVAILABILITY.     Do you require a callback: YES

## 2022-06-16 ENCOUNTER — OFFICE VISIT (OUTPATIENT)
Dept: CARDIOLOGY | Facility: CLINIC | Age: 67
End: 2022-06-16

## 2022-06-16 VITALS
HEIGHT: 69 IN | WEIGHT: 194 LBS | BODY MASS INDEX: 28.73 KG/M2 | HEART RATE: 76 BPM | DIASTOLIC BLOOD PRESSURE: 62 MMHG | SYSTOLIC BLOOD PRESSURE: 112 MMHG | OXYGEN SATURATION: 96 %

## 2022-06-16 DIAGNOSIS — I50.22 CHRONIC SYSTOLIC HEART FAILURE: Primary | ICD-10-CM

## 2022-06-16 DIAGNOSIS — I34.0 NON-RHEUMATIC MITRAL REGURGITATION: ICD-10-CM

## 2022-06-16 PROCEDURE — 99214 OFFICE O/P EST MOD 30 MIN: CPT | Performed by: INTERNAL MEDICINE

## 2022-06-16 PROCEDURE — 93000 ELECTROCARDIOGRAM COMPLETE: CPT | Performed by: INTERNAL MEDICINE

## 2022-06-16 RX ORDER — DIPHENOXYLATE HYDROCHLORIDE AND ATROPINE SULFATE 2.5; .025 MG/1; MG/1
1 TABLET ORAL AS NEEDED
COMMUNITY
Start: 2022-06-03

## 2022-06-16 NOTE — PROGRESS NOTES
[facility] AT [address], [address]  [place], [place], [address] [phone]  WWW.[facility]           OUTPATIENT CLINIC FOLLOW UP NOTE    Patient Care Team:  Patient Care Team:  Lonny Pugh DO as PCP - General (Family Medicine)  Laurent Flores MD as Consulting Physician (Cardiology)    Subjective:      Chief Complaint   Patient presents with   • Chronic systolic heart failure (CMS/HCC)       HPI:    Kb Walker is a 66 y.o. male.  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 Chronic systolic heart failure  1. EF 36 to 40%, diagnosed 11/2019.  Negative stress test for evidence of ischemia 11/2019  2. Moderate to severe mitral regurgitation, diagnosed 11/2019  1. Noted to be moderate in 3/2020 after fluid volume status improved.  3. Tobacco dependence    The patient presents today for follow-up.    Since his last visit he has done well from a cardiac standpoint.  Denies exertional chest pain.  Denies shortness of air.  Denies lower extremity swelling or palpitations.  Stopped taking Lasix.  Has needed.  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    Review of Systems:  As noted in the HPI    PFSH:  Patient Active Problem List   Diagnosis   • Personal history of tobacco use, presenting hazards to health   • Hyperglycemia   • Chronic systolic heart failure (HCC)   • Non-rheumatic mitral regurgitation   • Stage 2 moderate COPD by GOLD classification ([facility])         Current Outpatient Medications:   •  acetaminophen (TYLENOL) 325 MG tablet, Take 650 mg by mouth Every 6 (Six) Hours As Needed for Mild Pain ., Disp: , Rfl:   •  albuterol sulfate  (90 Base) MCG/ACT inhaler, Inhale 2 puffs Every 4 (Four) Hours As Needed., Disp: , Rfl:   •  carvedilol (COREG) 12.5 MG tablet, Take 1 tablet by mouth 2 (Two) Times a Day., Disp: 180 tablet, Rfl: 3  •  diphenhydrAMINE (BENADRYL) 50 MG tablet, Take 1 tablet by mouth At Night  "As Needed., Disp: , Rfl:   •  lisinopril (PRINIVIL,ZESTRIL) 10 MG tablet, TAKE 1 TABLET BY MOUTH EVERY DAY, Disp: 90 tablet, Rfl: 3  •  omeprazole (priLOSEC) 20 MG capsule, Take 20 mg by mouth Daily., Disp: , Rfl:   •  potassium chloride 10 MEQ CR tablet, TAKE 1 TABLET BY MOUTH DAILY. TAKE WITH LASIX TO PREVENT MUSCLE CRAMPS, Disp: 90 tablet, Rfl: 3  •  [employer] Magnesium Oxide 250 MG tablet, TAKE 1 TABLET BY MOUTH EVERY DAY, Disp: 90 tablet, Rfl: 3  •  diphenoxylate-atropine (LOMOTIL) 2.5-0.025 MG per tablet, Take 1 tablet by mouth As Needed., Disp: , Rfl:   •  furosemide (LASIX) 40 MG tablet, TAKE 1 TABLET BY MOUTH EVERY DAY, Disp: 90 tablet, Rfl: 3  •  nicotine polacrilex (NICORETTE) 2 MG gum, Chew 2 mg As Needed for Smoking Cessation., Disp: , Rfl:     No Known Allergies     reports that he has been smoking cigarettes. He has a 25.00 pack-year smoking history. He has never used smokeless tobacco.    Family History   Problem Relation Age of Onset   • Arthritis Mother    • Arthritis Father    • Hypertension Father    • Heart failure Father    • Coronary artery disease Brother          Objective:   Physical exam:  /62 (BP Location: Left arm, Patient Position: Sitting, Cuff Size: Adult)   Pulse 76   Ht 175.3 cm (69\")   Wt 88 kg (194 lb)   SpO2 96%   BMI 28.65 kg/m²   CONSTITUTIONAL: No acute distress  RESPIRATORY: Normal effort. Clear to auscultation bilaterally without wheezing or rales  CARDIOVASCULAR: Regular rate and rhythm with normal S1 and S2.  No murmur  PERIPHERAL VASCULAR: Normal radial pulse.  No lower extremity swelling bilaterally    Labs:      Lab Results   Component Value Date    CHOL 223 (H) 05/04/2022    CHOL 148 11/19/2019     Lab Results   Component Value Date    TRIG 315 (H) 05/04/2022    TRIG 115 11/19/2019     Lab Results   Component Value Date    HDL 35 (L) 05/04/2022    HDL 36 (L) 11/19/2019     Lab Results   Component Value Date     (H) 05/04/2022     No components found for: " [facility]    Diagnostic Data:      ECG 12 Lead    Date/Time: 6/16/2022 9:40 AM  Performed by: Laurent Flores MD  Authorized by: Laurent Flores MD   Comparison: compared with previous ECG from 11/18/2019  Similar to previous ECG  Rhythm: sinus rhythm            TTE 11/2019  · Left ventricular systolic function is moderately decreased. Estimated EF appears to be in the range of 36 - 40%.  · There is left ventricular global hypokinesis noted.  · Left ventricular diastolic dysfunction (grade III) consistent with reversible restrictive pattern.  · Right ventricular cavity is borderline dilated.  · Left atrial cavity size is severely dilated.  · Right atrial cavity size is moderately dilated.  · Moderate-to-severe mitral valve regurgitation is present  · Mild tricuspid valve regurgitation is present.  · Estimated right ventricular systolic pressure from tricuspid regurgitation is mildly elevated (35-45 mmHg).    Results for orders placed during the hospital encounter of 03/02/20    Adult Transthoracic Echo Complete W/ Cont if Necessary Per Protocol    Interpretation Summary  · Left ventricular systolic function is moderately decreased. Estimated EF appears to be in the range of 36 - 40%.  · Left ventricular wall thickness is consistent with mild concentric hypertrophy.  · The left ventricular cavity is mildly dilated.  · There is left ventricular global hypokinesis noted.  · Left ventricular diastolic dysfunction (grade I a) consistent with impaired relaxation.  · Moderate mitral valve regurgitation is present    Stress test 11/2019  · Left ventricular ejection fraction is moderately reduced (Calculated EF = 36%). The left ventricle was moderately dilated and had diffuse hypokinesis.  · Myocardial perfusion imaging indicates a normal myocardial perfusion study with no evidence of ischemia.  · There was coronary artery calcification noted in the territory of the LAD.  · Impressions are consistent with an intermediate risk  study.    Assessment and Plan:     Chronic systolic heart failure  Non-rheumatic mitral regurgitation  -NYHA functional class I  -Continue carvedilol, lisinopril   -Lasix PRN.  -Discontinue potassium    Tobacco dependence  -Encouraged abstaining from tobacco    Dyslipidemia  -Encouraged heart healthy diet, decreasing sweets and unnecessary fats  -Discussed that if his levels do not improve, could consider medical therapy    - No follow-ups on file.    Laurent Flores MD  06/16/22 09:29 EDT

## 2022-11-10 RX ORDER — LISINOPRIL 10 MG/1
TABLET ORAL
Qty: 90 TABLET | Refills: 3 | Status: SHIPPED | OUTPATIENT
Start: 2022-11-10

## 2022-11-10 RX ORDER — CARVEDILOL 12.5 MG/1
TABLET ORAL
Qty: 180 TABLET | Refills: 3 | Status: SHIPPED | OUTPATIENT
Start: 2022-11-10

## 2022-11-10 RX ORDER — POTASSIUM CHLORIDE 750 MG/1
10 TABLET, FILM COATED, EXTENDED RELEASE ORAL DAILY
Qty: 90 TABLET | Refills: 3 | OUTPATIENT
Start: 2022-11-10

## 2022-12-05 ENCOUNTER — TELEPHONE (OUTPATIENT)
Dept: FAMILY MEDICINE CLINIC | Facility: CLINIC | Age: 67
End: 2022-12-05

## 2022-12-05 NOTE — TELEPHONE ENCOUNTER
Attempted to contact pt, no answer. [facility] for return call.    Hub may relay message & document.     Pt had an overdue cologuard order for colorectal cancer screening. If pt would like to have this done by the end of the year he can  an FOBT kit from the office.

## 2023-05-12 ENCOUNTER — TELEPHONE (OUTPATIENT)
Dept: FAMILY MEDICINE CLINIC | Facility: CLINIC | Age: 68
End: 2023-05-12
Payer: MEDICARE

## 2023-05-12 NOTE — TELEPHONE ENCOUNTER
Contacted patient wife to inquire about cologuard. Current order - she will check if he would like to retry or complete colonoscopy

## 2023-10-23 ENCOUNTER — OFFICE VISIT (OUTPATIENT)
Dept: CARDIOLOGY | Facility: CLINIC | Age: 68
End: 2023-10-23
Payer: MEDICARE

## 2023-10-23 VITALS
HEIGHT: 68 IN | DIASTOLIC BLOOD PRESSURE: 80 MMHG | HEART RATE: 57 BPM | BODY MASS INDEX: 29.43 KG/M2 | WEIGHT: 194.2 LBS | SYSTOLIC BLOOD PRESSURE: 130 MMHG | OXYGEN SATURATION: 92 %

## 2023-10-23 DIAGNOSIS — I34.0 NON-RHEUMATIC MITRAL REGURGITATION: ICD-10-CM

## 2023-10-23 DIAGNOSIS — I50.22 CHRONIC SYSTOLIC HEART FAILURE: Primary | ICD-10-CM

## 2023-10-23 PROCEDURE — 93000 ELECTROCARDIOGRAM COMPLETE: CPT | Performed by: HOSPITALIST

## 2023-10-23 PROCEDURE — 99213 OFFICE O/P EST LOW 20 MIN: CPT | Performed by: HOSPITALIST

## 2023-10-23 PROCEDURE — 1160F RVW MEDS BY RX/DR IN RCRD: CPT | Performed by: HOSPITALIST

## 2023-10-23 PROCEDURE — 1159F MED LIST DOCD IN RCRD: CPT | Performed by: HOSPITALIST

## 2023-10-23 NOTE — PROGRESS NOTES
[facility] AT [address], [address]  [place], [place], [address] [phone]  [facility]           OUTPATIENT CLINIC FOLLOW UP NOTE    Patient Care Team:  Patient Care Team:  Lonny Pugh DO as PCP - General (Family Medicine)  Laurent Flores MD as Consulting Physician (Cardiology)    Subjective:      Chief Complaint   Patient presents with    Chronic systolic heart failure       HPI:    Kb Walker is a 68 y.o. male.  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  Negative stress test for evidence of ischemia 11/2019  Moderate to severe mitral regurgitation, diagnosed 11/2019  Noted to be moderate in 3/2020 after fluid volume status improved.  Tobacco dependence    The patient presents today for follow-up.    He has been doing well from a cardiac standpoint since his last visit.  Denies chest pain, shortness of breath, palpitations, lower extremity edema, lightheadedness or syncope. 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      Review of Systems:  As noted in the HPI    PFSH:  Patient Active Problem List   Diagnosis    Personal history of tobacco use, presenting hazards to health    Hyperglycemia    Chronic systolic heart failure    Non-rheumatic mitral regurgitation    Stage 2 moderate COPD by GOLD classification         Current Outpatient Medications:     acetaminophen (TYLENOL) 325 MG tablet, Take 2 tablets by mouth Every 6 (Six) Hours As Needed for Mild Pain., Disp: , Rfl:     carvedilol (COREG) 12.5 MG tablet, TAKE 1 TABLET BY MOUTH TWICE A DAY, Disp: 180 tablet, Rfl: 3    diphenhydrAMINE (BENADRYL) 50 MG tablet, Take 1 tablet by mouth At Night As Needed., Disp: , Rfl:     diphenoxylate-atropine (LOMOTIL) 2.5-0.025 MG per tablet, Take 1 tablet by mouth As Needed., Disp: , Rfl:     lisinopril (PRINIVIL,ZESTRIL) 10 MG tablet, TAKE  "1 TABLET BY MOUTH EVERY DAY, Disp: 90 tablet, Rfl: 3    No Known Allergies     reports that he has been smoking cigarettes. He has a 25.00 pack-year smoking history. He has never used smokeless tobacco.    Family History   Problem Relation Age of Onset    Arthritis Mother     Arthritis Father     Hypertension Father     Heart failure Father     Coronary artery disease Brother          Objective:   Physical exam:  /80 (BP Location: Right arm, Patient Position: Sitting, Cuff Size: Adult)   Pulse 57   Ht 172.7 cm (68\")   Wt 88.1 kg (194 lb 3.2 oz)   SpO2 92%   BMI 29.53 kg/m²   CONSTITUTIONAL: No acute distress  RESPIRATORY: Normal effort. Clear to auscultation bilaterally without wheezing or rales  CARDIOVASCULAR: Regular rate and rhythm with normal S1 and S2.  No murmur  PERIPHERAL VASCULAR: Normal radial pulse.  No lower extremity swelling bilaterally    Labs:      Lab Results   Component Value Date    CHOL 223 (H) 05/04/2022    CHOL 148 11/19/2019     Lab Results   Component Value Date    TRIG 315 (H) 05/04/2022    TRIG 115 11/19/2019     Lab Results   Component Value Date    HDL 35 (L) 05/04/2022    HDL 36 (L) 11/19/2019     Lab Results   Component Value Date     (H) 05/04/2022     No components found for: \"LDLDIRECTC\"    Diagnostic Data:      ECG 12 Lead    Date/Time: 10/23/2023 4:01 PM  Performed by: Kimi Ledesma APRN    Authorized by: Kimi Ledesma APRN  Comparison: compared with previous ECG from 6/16/2022  Similar to previous ECG  Rhythm: sinus rhythm  Rate: normal  BPM: 75  Conduction: conduction normal          TTE 11/2019  Left ventricular systolic function is moderately decreased. Estimated EF appears to be in the range of 36 - 40%.  There is left ventricular global hypokinesis noted.  Left ventricular diastolic dysfunction (grade III) consistent with reversible restrictive pattern.  Right ventricular cavity is borderline dilated.  Left atrial cavity size is severely " dilated.  Right atrial cavity size is moderately dilated.  Moderate-to-severe mitral valve regurgitation is present  Mild tricuspid valve regurgitation is present.  Estimated right ventricular systolic pressure from tricuspid regurgitation is mildly elevated (35-45 mmHg).    Results for orders placed during the hospital encounter of 03/02/20    Adult Transthoracic Echo Complete W/ Cont if Necessary Per Protocol    Interpretation Summary  · Left ventricular systolic function is moderately decreased. Estimated EF appears to be in the range of 36 - 40%.  · Left ventricular wall thickness is consistent with mild concentric hypertrophy.  · The left ventricular cavity is mildly dilated.  · There is left ventricular global hypokinesis noted.  · Left ventricular diastolic dysfunction (grade I a) consistent with impaired relaxation.  · Moderate mitral valve regurgitation is present    Stress test 11/2019  Left ventricular ejection fraction is moderately reduced (Calculated EF = 36%). The left ventricle was moderately dilated and had diffuse hypokinesis.  Myocardial perfusion imaging indicates a normal myocardial perfusion study with no evidence of ischemia.  There was coronary artery calcification noted in the territory of the LAD.  Impressions are consistent with an intermediate risk study.    Assessment and Plan:     Chronic systolic heart failure  Non-rheumatic mitral regurgitation  -Euvolemic on exam today.   -NYHA functional class I  -Continue carvedilol, lisinopril     Tobacco dependence  -Encouraged abstaining from tobacco    Dyslipidemia  -Encouraged heart healthy diet, decreasing sweets and unnecessary fats  -Discussed that if his levels do not improve, could consider medical therapy  -Yearly lipid panel with PCP.     - Return in about 1 year (around 10/23/2024) for Next scheduled follow up with EKG .    Electronically signed by CHARLI Pressley, 10/23/23, 3:55 PM EDT.

## 2023-11-08 RX ORDER — CARVEDILOL 12.5 MG/1
TABLET ORAL
Qty: 180 TABLET | Refills: 3 | Status: SHIPPED | OUTPATIENT
Start: 2023-11-08

## 2023-11-08 RX ORDER — LISINOPRIL 10 MG/1
TABLET ORAL
Qty: 90 TABLET | Refills: 3 | Status: SHIPPED | OUTPATIENT
Start: 2023-11-08

## 2024-11-06 RX ORDER — LISINOPRIL 10 MG/1
TABLET ORAL
Qty: 90 TABLET | Refills: 0 | Status: SHIPPED | OUTPATIENT
Start: 2024-11-06

## 2024-11-06 RX ORDER — CARVEDILOL 12.5 MG/1
TABLET ORAL
Qty: 180 TABLET | Refills: 0 | Status: SHIPPED | OUTPATIENT
Start: 2024-11-06

## 2024-12-09 ENCOUNTER — OFFICE VISIT (OUTPATIENT)
Dept: CARDIOLOGY | Facility: CLINIC | Age: 69
End: 2024-12-09
Payer: MEDICARE

## 2024-12-09 VITALS
SYSTOLIC BLOOD PRESSURE: 102 MMHG | WEIGHT: 190.8 LBS | BODY MASS INDEX: 28.92 KG/M2 | HEIGHT: 68 IN | OXYGEN SATURATION: 94 % | HEART RATE: 95 BPM | DIASTOLIC BLOOD PRESSURE: 64 MMHG

## 2024-12-09 DIAGNOSIS — I50.22 CHRONIC SYSTOLIC HEART FAILURE: Primary | ICD-10-CM

## 2024-12-09 DIAGNOSIS — I34.0 NON-RHEUMATIC MITRAL REGURGITATION: ICD-10-CM

## 2024-12-09 PROCEDURE — 1159F MED LIST DOCD IN RCRD: CPT | Performed by: HOSPITALIST

## 2024-12-09 PROCEDURE — 99214 OFFICE O/P EST MOD 30 MIN: CPT | Performed by: HOSPITALIST

## 2024-12-09 PROCEDURE — 1160F RVW MEDS BY RX/DR IN RCRD: CPT | Performed by: HOSPITALIST

## 2024-12-09 PROCEDURE — 93000 ELECTROCARDIOGRAM COMPLETE: CPT | Performed by: HOSPITALIST

## 2024-12-09 NOTE — PROGRESS NOTES
" [facility] AT [facility]   [address] [place] [address], [address]  [place], [place], [address] [phone]  WWW.[facility]           OUTPATIENT CLINIC FOLLOW UP NOTE    Patient Care Team:  Patient Care Team:  Lonny Pugh DO as PCP - General (Family Medicine)  Laurent Flores MD as Consulting Physician (Cardiology)  Kimi Ledesma APRN as Nurse Practitioner (Cardiology)    Subjective:      Chief Complaint   Patient presents with    Chronic systolic heart failure       HPI:    Kb Walker is a 69 y.o. male.  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  Negative stress test for evidence of ischemia 11/2019  Moderate to severe mitral regurgitation, diagnosed 11/2019  Noted to be moderate in 3/2020 after fluid volume status improved.  Tobacco dependence    The patient presents today for follow-up.    Doing well from a cardiac standpoint since his last visit.  Denies chest pain, shortness of breath, palpitations or lower extremity edema.  Does have some fatigue and is \"slower\" than he used to be but continues to work on the farm without cardiopulmonary symptoms.     Review of Systems:  As noted in the HPI    PFSH:  Patient Active Problem List   Diagnosis    Personal history of tobacco use, presenting hazards to health    Hyperglycemia    Chronic systolic heart failure    Non-rheumatic mitral regurgitation    Stage 2 moderate COPD by GOLD classification         Current Outpatient Medications:     acetaminophen (TYLENOL) 325 MG tablet, Take 2 tablets by mouth Every 6 (Six) Hours As Needed for Mild Pain., Disp: , Rfl:     carvedilol (COREG) 12.5 MG tablet, TAKE 1 TABLET BY MOUTH TWICE A DAY, Disp: 180 tablet, Rfl: 0    diphenhydrAMINE (BENADRYL) 50 MG tablet, Take 1 tablet by mouth At Night As Needed., Disp: , Rfl:     diphenoxylate-atropine (LOMOTIL) 2.5-0.025 MG per tablet, Take 1 " "tablet by mouth As Needed., Disp: , Rfl:     lisinopril (PRINIVIL,ZESTRIL) 10 MG tablet, TAKE 1 TABLET BY MOUTH EVERY DAY, Disp: 90 tablet, Rfl: 0    No Known Allergies     reports that he has been smoking cigarettes. He has a 25 pack-year smoking history. He has never used smokeless tobacco.    Family History   Problem Relation Age of Onset    Arthritis Mother     Arthritis Father     Hypertension Father     Heart failure Father     Coronary artery disease Brother          Objective:   Physical exam:  /64 (BP Location: Right arm, Patient Position: Sitting)   Pulse 95   Ht 172.7 cm (68\")   Wt 86.5 kg (190 lb 12.8 oz)   SpO2 94%   BMI 29.01 kg/m²   CONSTITUTIONAL: No acute distress  RESPIRATORY: Normal effort. Clear to auscultation bilaterally without wheezing or rales  CARDIOVASCULAR: Regular rate and rhythm with normal S1 and S2.  No murmur  PERIPHERAL VASCULAR: Normal radial pulse.  No lower extremity swelling bilaterally    Labs:      Lab Results   Component Value Date    CHOL 223 (H) 05/04/2022    CHOL 148 11/19/2019     Lab Results   Component Value Date    TRIG 315 (H) 05/04/2022    TRIG 115 11/19/2019     Lab Results   Component Value Date    HDL 35 (L) 05/04/2022    HDL 36 (L) 11/19/2019     Lab Results   Component Value Date     (H) 05/04/2022     No components found for: \"LDLDIRECTC\"    Diagnostic Data:      ECG 12 Lead    Date/Time: 12/9/2024 3:39 PM  Performed by: Kimi Ledesma APRN    Authorized by: Kimi Ledesma APRN  Comparison: compared with previous ECG from 10/23/2023  Similar to previous ECG  Rhythm: sinus rhythm  Rate: normal  BPM: 91  Conduction: conduction normal          TTE 11/2019  Left ventricular systolic function is moderately decreased. Estimated EF appears to be in the range of 36 - 40%.  There is left ventricular global hypokinesis noted.  Left ventricular diastolic dysfunction (grade III) consistent with reversible restrictive pattern.  Right ventricular cavity " is borderline dilated.  Left atrial cavity size is severely dilated.  Right atrial cavity size is moderately dilated.  Moderate-to-severe mitral valve regurgitation is present  Mild tricuspid valve regurgitation is present.  Estimated right ventricular systolic pressure from tricuspid regurgitation is mildly elevated (35-45 mmHg).    Results for orders placed during the hospital encounter of 03/02/20    Adult Transthoracic Echo Complete W/ Cont if Necessary Per Protocol    Interpretation Summary  · Left ventricular systolic function is moderately decreased. Estimated EF appears to be in the range of 36 - 40%.  · Left ventricular wall thickness is consistent with mild concentric hypertrophy.  · The left ventricular cavity is mildly dilated.  · There is left ventricular global hypokinesis noted.  · Left ventricular diastolic dysfunction (grade I a) consistent with impaired relaxation.  · Moderate mitral valve regurgitation is present    Stress test 11/2019  Left ventricular ejection fraction is moderately reduced (Calculated EF = 36%). The left ventricle was moderately dilated and had diffuse hypokinesis.  Myocardial perfusion imaging indicates a normal myocardial perfusion study with no evidence of ischemia.  There was coronary artery calcification noted in the territory of the LAD.  Impressions are consistent with an intermediate risk study.    Assessment and Plan:     Chronic systolic heart failure  Non-rheumatic mitral regurgitation  -Euvolemic on exam today.   -NYHA functional class I  -Continue carvedilol, lisinopril   -Repeat echo to reassess EF and mitral valve.     Tobacco dependence  -Encouraged abstaining from tobacco    Dyslipidemia  -Encouraged heart healthy diet, decreasing sweets and unnecessary fats  -Discussed that if his levels do not improve, could consider medical therapy  -Yearly lipid panel with PCP.     - Return in about 1 year (around 12/9/2025) for Next scheduled follow up with Dr. Flores, EKG  .    Electronically signed by Kimi Ledesma, CHARLI, 12/09/24, 3:38 PM EST.

## 2025-01-03 ENCOUNTER — READMISSION MANAGEMENT (OUTPATIENT)
Dept: CALL CENTER | Facility: HOSPITAL | Age: 70
End: 2025-01-03
Payer: MEDICARE

## 2025-01-03 RX ORDER — LISINOPRIL 10 MG/1
TABLET ORAL
Qty: 90 TABLET | Refills: 0 | Status: SHIPPED | OUTPATIENT
Start: 2025-01-03

## 2025-01-03 RX ORDER — CARVEDILOL 12.5 MG/1
TABLET ORAL
Qty: 180 TABLET | Refills: 0 | Status: SHIPPED | OUTPATIENT
Start: 2025-01-03

## 2025-01-04 NOTE — OUTREACH NOTE
Prep Survey      Flowsheet Row Responses   Hoahaoism facility patient discharged from? Non-BH   Is LACE score < 7 ? Non-BH Discharge   Eligibility [facility]   Date of Admission 01/01/25   Date of Discharge 01/03/25   Discharge Disposition Home or Self Care   Discharge diagnosis PNA   Does the patient have one of the following disease processes/diagnoses(primary or secondary)? Pneumonia   Does the patient have Home health ordered? No   Is there a DME ordered? No   Prep survey completed? Yes            QUOC RIVERA - Registered Nurse

## 2025-01-06 ENCOUNTER — TRANSITIONAL CARE MANAGEMENT TELEPHONE ENCOUNTER (OUTPATIENT)
Dept: CALL CENTER | Facility: HOSPITAL | Age: 70
End: 2025-01-06
Payer: MEDICARE

## 2025-01-06 NOTE — OUTREACH NOTE
Call Center TCM Note      Flowsheet Row Responses   Muslim facility patient discharged from? Non-  [[facility]]   Does the patient have one of the following disease processes/diagnoses(primary or secondary)? Pneumonia   TCM attempt successful? No   Unsuccessful attempts Attempt 1  [Only one phone number available for patient and wife.]   Call Status Voice mail issues  [Voicemail has not been set up.]            Brianna Corbett RN    1/6/2025, 14:30 EST

## 2025-01-06 NOTE — OUTREACH NOTE
Call Center TCM Note      Flowsheet Row Responses   Pentecostal facility patient discharged from? Non-  [[facility]]   Does the patient have one of the following disease processes/diagnoses(primary or secondary)? Pneumonia   TCM attempt successful? No   Unsuccessful attempts Attempt 2  [Only one phone number available for patient and wife.]   Call Status Voice mail issues  [Voicemail has not been set up.]            Brianna Corbett RN    1/6/2025, 15:22 EST

## 2025-01-07 ENCOUNTER — TRANSITIONAL CARE MANAGEMENT TELEPHONE ENCOUNTER (OUTPATIENT)
Dept: CALL CENTER | Facility: HOSPITAL | Age: 70
End: 2025-01-07
Payer: MEDICARE

## 2025-01-07 NOTE — OUTREACH NOTE
Call Center TCM Note      Flowsheet Row Responses   Scientology facility patient discharged from? Non-  [[facility]]   Does the patient have one of the following disease processes/diagnoses(primary or secondary)? Pneumonia   TCM attempt successful? No  [vr for wife Lety]   Unsuccessful attempts Attempt 3            Latasha Stack RN    1/7/2025, 14:45 EST

## 2025-01-09 ENCOUNTER — OFFICE VISIT (OUTPATIENT)
Dept: FAMILY MEDICINE CLINIC | Facility: CLINIC | Age: 70
End: 2025-01-09
Payer: MEDICARE

## 2025-01-09 ENCOUNTER — TELEPHONE (OUTPATIENT)
Age: 70
End: 2025-01-09
Payer: MEDICARE

## 2025-01-09 ENCOUNTER — LAB (OUTPATIENT)
Dept: LAB | Facility: HOSPITAL | Age: 70
End: 2025-01-09
Payer: MEDICARE

## 2025-01-09 VITALS
BODY MASS INDEX: 29.89 KG/M2 | OXYGEN SATURATION: 95 % | HEART RATE: 55 BPM | WEIGHT: 197.2 LBS | HEIGHT: 68 IN | SYSTOLIC BLOOD PRESSURE: 110 MMHG | DIASTOLIC BLOOD PRESSURE: 80 MMHG

## 2025-01-09 DIAGNOSIS — I50.22 CHRONIC SYSTOLIC HEART FAILURE: ICD-10-CM

## 2025-01-09 DIAGNOSIS — Z13.89 SCREENING FOR BLOOD OR PROTEIN IN URINE: ICD-10-CM

## 2025-01-09 DIAGNOSIS — Z13.220 SCREENING FOR HYPERLIPIDEMIA: ICD-10-CM

## 2025-01-09 DIAGNOSIS — R73.9 HYPERGLYCEMIA: ICD-10-CM

## 2025-01-09 DIAGNOSIS — Z13.0 SCREENING FOR DEFICIENCY ANEMIA: ICD-10-CM

## 2025-01-09 DIAGNOSIS — Z13.29 SCREENING FOR ENDOCRINE DISORDER: ICD-10-CM

## 2025-01-09 DIAGNOSIS — J18.9 PNEUMONIA DUE TO INFECTIOUS ORGANISM, UNSPECIFIED LATERALITY, UNSPECIFIED PART OF LUNG: ICD-10-CM

## 2025-01-09 DIAGNOSIS — Z00.00 PREVENTATIVE HEALTH CARE: ICD-10-CM

## 2025-01-09 DIAGNOSIS — J96.01 ACUTE RESPIRATORY FAILURE WITH HYPOXIA: ICD-10-CM

## 2025-01-09 DIAGNOSIS — J44.9 STAGE 2 MODERATE COPD BY GOLD CLASSIFICATION: ICD-10-CM

## 2025-01-09 DIAGNOSIS — Z12.5 SCREENING PSA (PROSTATE SPECIFIC ANTIGEN): ICD-10-CM

## 2025-01-09 DIAGNOSIS — Z87.891 PERSONAL HISTORY OF TOBACCO USE, PRESENTING HAZARDS TO HEALTH: ICD-10-CM

## 2025-01-09 DIAGNOSIS — E87.1 HYPONATREMIA: ICD-10-CM

## 2025-01-09 DIAGNOSIS — J96.01 ACUTE RESPIRATORY FAILURE WITH HYPOXIA: Primary | ICD-10-CM

## 2025-01-09 LAB
ALBUMIN SERPL-MCNC: 3.7 G/DL (ref 3.5–5.2)
ALBUMIN UR-MCNC: <1.2 MG/DL
ALBUMIN/GLOB SERPL: 1 G/DL
ALP SERPL-CCNC: 96 U/L (ref 39–117)
ALT SERPL W P-5'-P-CCNC: 22 U/L (ref 1–41)
ANION GAP SERPL CALCULATED.3IONS-SCNC: 15.7 MMOL/L (ref 5–15)
AST SERPL-CCNC: 17 U/L (ref 1–40)
BASOPHILS # BLD MANUAL: 0.35 10*3/MM3 (ref 0–0.2)
BASOPHILS NFR BLD MANUAL: 2 % (ref 0–1.5)
BILIRUB SERPL-MCNC: <0.2 MG/DL (ref 0–1.2)
BILIRUB UR QL STRIP: NEGATIVE
BUN SERPL-MCNC: 20 MG/DL (ref 8–23)
BUN/CREAT SERPL: 14 (ref 7–25)
CALCIUM SPEC-SCNC: 9.5 MG/DL (ref 8.6–10.5)
CHLORIDE SERPL-SCNC: 98 MMOL/L (ref 98–107)
CHOLEST SERPL-MCNC: 164 MG/DL (ref 0–200)
CLARITY UR: CLEAR
CO2 SERPL-SCNC: 29.3 MMOL/L (ref 22–29)
COLOR UR: YELLOW
CREAT SERPL-MCNC: 1.43 MG/DL (ref 0.76–1.27)
CREAT UR-MCNC: 83 MG/DL
DEPRECATED RDW RBC AUTO: 44.1 FL (ref 37–54)
EGFRCR SERPLBLD CKD-EPI 2021: 52.4 ML/MIN/1.73
EOSINOPHIL # BLD MANUAL: 1.04 10*3/MM3 (ref 0–0.4)
EOSINOPHIL NFR BLD MANUAL: 6 % (ref 0.3–6.2)
ERYTHROCYTE [DISTWIDTH] IN BLOOD BY AUTOMATED COUNT: 13.1 % (ref 12.3–15.4)
GIANT PLATELETS: ABNORMAL
GLOBULIN UR ELPH-MCNC: 3.7 GM/DL
GLUCOSE SERPL-MCNC: 119 MG/DL (ref 65–99)
GLUCOSE UR STRIP-MCNC: NEGATIVE MG/DL
HBA1C MFR BLD: 6.2 % (ref 4.8–5.6)
HCT VFR BLD AUTO: 45.5 % (ref 37.5–51)
HDLC SERPL-MCNC: 37 MG/DL (ref 40–60)
HGB BLD-MCNC: 15.3 G/DL (ref 13–17.7)
HGB UR QL STRIP.AUTO: NEGATIVE
KETONES UR QL STRIP: NEGATIVE
LDLC SERPL CALC-MCNC: 90 MG/DL (ref 0–100)
LDLC/HDLC SERPL: 2.24 {RATIO}
LEUKOCYTE ESTERASE UR QL STRIP.AUTO: NEGATIVE
LYMPHOCYTES # BLD MANUAL: 2.76 10*3/MM3 (ref 0.7–3.1)
LYMPHOCYTES NFR BLD MANUAL: 9 % (ref 5–12)
MCH RBC QN AUTO: 31.3 PG (ref 26.6–33)
MCHC RBC AUTO-ENTMCNC: 33.6 G/DL (ref 31.5–35.7)
MCV RBC AUTO: 93 FL (ref 79–97)
METAMYELOCYTES NFR BLD MANUAL: 2 % (ref 0–0)
MICROALBUMIN/CREAT UR: NORMAL MG/G{CREAT}
MONOCYTES # BLD: 1.55 10*3/MM3 (ref 0.1–0.9)
MYELOCYTES NFR BLD MANUAL: 1 % (ref 0–0)
NEUTROPHILS # BLD AUTO: 11.05 10*3/MM3 (ref 1.7–7)
NEUTROPHILS NFR BLD MANUAL: 64 % (ref 42.7–76)
NITRITE UR QL STRIP: NEGATIVE
NRBC BLD AUTO-RTO: 0 /100 WBC (ref 0–0.2)
PH UR STRIP.AUTO: 6 [PH] (ref 5–8)
PLATELET # BLD AUTO: 286 10*3/MM3 (ref 140–450)
PMV BLD AUTO: 9.9 FL (ref 6–12)
POIKILOCYTOSIS BLD QL SMEAR: ABNORMAL
POTASSIUM SERPL-SCNC: 6.2 MMOL/L (ref 3.5–5.2)
PROT SERPL-MCNC: 7.4 G/DL (ref 6–8.5)
PROT UR QL STRIP: NEGATIVE
PSA SERPL-MCNC: 2.22 NG/ML (ref 0–4)
RBC # BLD AUTO: 4.89 10*6/MM3 (ref 4.14–5.8)
SODIUM SERPL-SCNC: 143 MMOL/L (ref 136–145)
SP GR UR STRIP: 1.02 (ref 1–1.03)
TRIGL SERPL-MCNC: 221 MG/DL (ref 0–150)
TSH SERPL DL<=0.05 MIU/L-ACNC: 1.37 UIU/ML (ref 0.27–4.2)
UROBILINOGEN UR QL STRIP: NORMAL
VARIANT LYMPHS NFR BLD MANUAL: 16 % (ref 19.6–45.3)
VLDLC SERPL-MCNC: 37 MG/DL (ref 5–40)
WBC MORPH BLD: NORMAL
WBC NRBC COR # BLD AUTO: 17.27 10*3/MM3 (ref 3.4–10.8)

## 2025-01-09 PROCEDURE — 1125F AMNT PAIN NOTED PAIN PRSNT: CPT | Performed by: FAMILY MEDICINE

## 2025-01-09 PROCEDURE — 80053 COMPREHEN METABOLIC PANEL: CPT

## 2025-01-09 PROCEDURE — 83036 HEMOGLOBIN GLYCOSYLATED A1C: CPT

## 2025-01-09 PROCEDURE — 1111F DSCHRG MED/CURRENT MED MERGE: CPT | Performed by: FAMILY MEDICINE

## 2025-01-09 PROCEDURE — 85025 COMPLETE CBC W/AUTO DIFF WBC: CPT

## 2025-01-09 PROCEDURE — 85007 BL SMEAR W/DIFF WBC COUNT: CPT

## 2025-01-09 PROCEDURE — 99495 TRANSJ CARE MGMT MOD F2F 14D: CPT | Performed by: FAMILY MEDICINE

## 2025-01-09 PROCEDURE — 84443 ASSAY THYROID STIM HORMONE: CPT

## 2025-01-09 PROCEDURE — 80061 LIPID PANEL: CPT

## 2025-01-09 PROCEDURE — 82570 ASSAY OF URINE CREATININE: CPT

## 2025-01-09 PROCEDURE — G0103 PSA SCREENING: HCPCS

## 2025-01-09 PROCEDURE — 82043 UR ALBUMIN QUANTITATIVE: CPT

## 2025-01-09 PROCEDURE — 81003 URINALYSIS AUTO W/O SCOPE: CPT

## 2025-01-09 RX ORDER — BUDESONIDE AND FORMOTEROL FUMARATE DIHYDRATE 160; 4.5 UG/1; UG/1
2 AEROSOL RESPIRATORY (INHALATION)
Qty: 10.2 G | Refills: 12 | Status: SHIPPED | OUTPATIENT
Start: 2025-01-09

## 2025-01-09 RX ORDER — NICOTINE 21 MG/24HR
1 PATCH, TRANSDERMAL 24 HOURS TRANSDERMAL DAILY
COMMUNITY
Start: 2025-01-04 | End: 2025-02-03

## 2025-01-09 RX ORDER — DIPHENOXYLATE HYDROCHLORIDE AND ATROPINE SULFATE 2.5; .025 MG/1; MG/1
1 TABLET ORAL DAILY
COMMUNITY

## 2025-01-09 RX ORDER — ALBUTEROL SULFATE 90 UG/1
2 INHALANT RESPIRATORY (INHALATION)
COMMUNITY
Start: 2025-01-03 | End: 2025-02-02

## 2025-01-09 RX ORDER — DOXYCYCLINE 100 MG/1
CAPSULE ORAL
COMMUNITY
Start: 2025-01-03

## 2025-01-09 RX ORDER — PREDNISONE 20 MG/1
TABLET ORAL
COMMUNITY
Start: 2025-01-03 | End: 2025-01-09

## 2025-01-09 RX ORDER — FLUTICASONE PROPIONATE AND SALMETEROL XINAFOATE 45; 21 UG/1; UG/1
2 AEROSOL, METERED RESPIRATORY (INHALATION)
COMMUNITY
Start: 2025-01-03 | End: 2025-01-09 | Stop reason: CLARIF

## 2025-01-09 NOTE — PATIENT INSTRUCTIONS
I would like you to have your labs done about a week prior to your next appointment.  You do not need an appointment, but I would advise to call our office a few days before you plan to have your labs done to confirm that orders are in and active.  We will discuss the results at your next scheduled visit.  -Lab services are available at any [facility] outpatient lab center, including across the street at [address] [place] [address]

## 2025-01-09 NOTE — PROGRESS NOTES
"Hospital Follow-Up/Transition of Care Visit     Patient Name: Kb Walker  : 1955   MRN: [id number]   Care Team: Patient Care Team:  Lonny Pugh DO as PCP - General (Family Medicine)  Laurent Flores MD as Consulting Physician (Cardiology)  Kimi Ledesma APRN as Nurse Practitioner (Cardiology)    Chief Complaint:    Chief Complaint   Patient presents with    Pneumonia     Had last week, follow up from hospital       History of Present Illness: Kb Walker is a 69 y.o. male who presents today for TCM visit.    Within 48 business hours after discharge our office contacted him via telephone to coordinate his care and needs.      I reviewed and discussed the details of that call along with the discharge summary, hospital problems, inpatient lab results, inpatient diagnostic studies, and consultation reports with Kb.      Current outpatient and discharge medications have been reconciled for the patient.  Reviewed by: Lonny Pugh DO          2019     3:57 PM 1/3/2025     7:07 PM   Date of TCM Phone Call   [facility]   Date of Admission 2019   Date of Discharge 2019 1/3/2025   Discharge Disposition Home or Self Care Home or Self Care     Risk for Readmission (LACE) No data recorded    History of Present Illness   Course During Hospital Stay:    Per admitting HPI: \"Kb Walker is a 69 y.o. male with a history of CHF, hypertension, nicotine dependence presents to [facility] in [place], [place] for further evaluation and management of cough x 1 month. Patient states 1 month prior to admission patient began to have a productive cough with yellow sputum and generalized malaise. 3 weeks prior to admission patient went to his PCP and was prescribed amoxicillin which provided some relief. Patient states that his cough has gradually gotten worse. Patient sought evaluation at [facility] emergency " "department where his O2 saturation was found to be 85% on room air. Patient does not wear oxygen at home. Patient denies shortness of breath, fever, nausea, and vomiting. On further review from [facility] emergency department triage note patient's wife reports that patient is \"easily short winded\". 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    Patient pertinent labs were as follows:  WBC 11  H&H 13.6/41.9  Sodium 129  Potassium 4.6  Lactic acid 0.6  Troponin less than 4  proBNP 353  Venous pH 7.35    Patient pertinent imaging was as follows:  Chest x-ray \"no acute process\"    Patient was transferred to [facility]. Patient is being admitted for acute hypoxic respiratory failure. Patient is resting comfortably at time of admission, in no acute distress. All patient questions were answered at this time. Patient is agreeable to admission and plan, though eager for discharge.\"    Hospital Problem List     Patient Active Problem List   Diagnosis   Acute hypoxic respiratory failure (HCC)   HTN (hypertension)   CHF (congestive heart failure) (HCC)   Dependence on nicotine from cigarettes   Hyponatremia   Leukocytosis     Hospital Course:  The patient was admitted under hospital medicine services. He was started on IV antibiotics for empiric coverage. Initially required 3L of supplemental oxygen to maintain saturation. Suspected the patient had underlying COPD due to his history and history/current tobacco use. 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 The patient does not have a diagnosis of COPD but encouraged him to follow up with his primacy care provider and pulmonology if further testing supported. Planned to have repeated echocardiogram as the patient has a history of HFrEF, however, the patient declined this as he has a cardiologist and prefers to have the imaging done at his scheduled appointment. CXR demonstrated " normal cardiac silhouette and clear lungs. ProBNP was mildly elevated to 353. No pleural effusion identified. Sputum culture and respiratory panel was negative. He did have leukocytosis upon presentation to 11.0 that improved to 10.6 on day of discharge. He reported significant improvement of his symptoms with breathing treatments, antibiotics, and steroid. His hyponatremia improved from 129 to 132 on the day of discharge. The patient reports not drinking enough fluids and was encouraged to maintain adequate hydration. His respiratory status improved and he underwent a desaturation test which qualified for home oxygen. The patient was discharged with remaining course of Doxycycline and Augmentin. Advair and rescue inhaler also sent which he may use until follow up with PCP. He was sent remaining steroid dosing for 5 total days. The patient was also started on Jardiance for GDMT. He is to continue his Carvedilol and Lisinopril. Discuss with his cardiologist additional medication like Aldactone for full systolic heart failure treatment if indicated. Sent nicotine patch to aid in smoking cessation. The patient received adequate education regarding COPD, CHF exacerbation, and smoking risks. He is to follow up with his primary care provider in 1 week for continuation of care.     Status Since Discharge:  Finished augmentin yesterday, has 1 more day of doxy. He stopped after 3 days on prednisone bc he didn't feel he needed it. Not using the albuterol inhaler, never was able to  the Advair inhaler due to insurance coverage/formulary. Also not taking jardiance for same reason. Never used the oxygen at home.    Smoking, using patches, reports he knows it's time for him to quit. At 1/2ppd currently.     The following portions of the patient's history were reviewed and updated as appropriate: allergies, current medications, past family history, past medical history, past social history, past surgical history, and  problem list.    This patient is accompanied by their wife Lety who contributes to the history of their care.    The following portions of the patient's history were reviewed and updated as appropriate: allergies, current medications, past family history, past medical history, past social history, past surgical history and problem list.    Subjective      Review of Systems:   Review of Systems - See HPI    Past Medical History:   Past Medical History:   Diagnosis Date    Arthritis     Gall stones     Pneumonia 12/2019    Systolic heart failure        Past Surgical History: History reviewed. No pertinent surgical history.    Family History:   Family History   Problem Relation Age of Onset    Arthritis Mother     Arthritis Father     Hypertension Father     Heart failure Father     Coronary artery disease Brother        Social History:   Social History     Socioeconomic History    Marital status:    Tobacco Use    Smoking status: Every Day     Current packs/day: 0.50     Average packs/day: 0.5 packs/day for 50.0 years (25.0 ttl pk-yrs)     Types: Cigarettes    Smokeless tobacco: Never    Tobacco comments:     placed the patch 11/27/2019   Vaping Use    Vaping status: Never Used   Substance and Sexual Activity    Alcohol use: Not Currently     Comment: occasional    Drug use: No    Sexual activity: Defer       Tobacco History:   Social History     Tobacco Use   Smoking Status Every Day    Current packs/day: 0.50    Average packs/day: 0.5 packs/day for 50.0 years (25.0 ttl pk-yrs)    Types: Cigarettes   Smokeless Tobacco Never   Tobacco Comments    placed the patch 11/27/2019       Medications:     Current Outpatient Medications:     acetaminophen (TYLENOL) 325 MG tablet, Take 2 tablets by mouth Every 6 (Six) Hours As Needed for Mild Pain., Disp: , Rfl:     carvedilol (COREG) 12.5 MG tablet, TAKE 1 TABLET BY MOUTH TWICE A DAY, Disp: 180 tablet, Rfl: 0    diphenhydrAMINE (BENADRYL) 50 MG tablet, Take 1 tablet  "by mouth At Night As Needed., Disp: , Rfl:     doxycycline (VIBRAMYCIN) 100 MG capsule, TAKE 1 CAPSULE (100 MG TOTAL) BY MOUTH EVERY 12 (TWELVE) HOURS FOR 5 DAYS., Disp: , Rfl:     lisinopril (PRINIVIL,ZESTRIL) 10 MG tablet, TAKE 1 TABLET BY MOUTH EVERY DAY, Disp: 90 tablet, Rfl: 0    melatonin 3 MG tablet, Take 1 tablet by mouth Daily., Disp: , Rfl:     Multi-Vitamin tablet tablet, Take 1 tablet by mouth Daily., Disp: , Rfl:     nicotine (NICODERM CQ) 21 MG/24HR patch, Place 1 patch on the skin as directed by provider Daily., Disp: , Rfl:     albuterol sulfate  (90 Base) MCG/ACT inhaler, Inhale 2 puffs. (Patient not taking: Reported on 1/9/2025), Disp: , Rfl:     amoxicillin-clavulanate (AUGMENTIN) 875-125 MG per tablet, TAKE 1 TABLET BY MOUTH 2 (TWO) TIMES DAILY WITH BREAKFAST AND DINNER FOR 5 DAYS. (Patient not taking: Reported on 1/9/2025), Disp: , Rfl:     budesonide-formoterol (Symbicort) 160-4.5 MCG/ACT inhaler, Inhale 2 puffs 2 (Two) Times a Day., Disp: 10.2 g, Rfl: 12    diphenoxylate-atropine (LOMOTIL) 2.5-0.025 MG per tablet, Take 1 tablet by mouth As Needed. (Patient not taking: Reported on 1/9/2025), Disp: , Rfl:     empagliflozin (JARDIANCE) 10 MG tablet tablet, Take 1 tablet by mouth Daily. (Patient not taking: Reported on 1/9/2025), Disp: , Rfl:     Allergies:   No Known Allergies    Objective   Objective     Physical Exam:  Vital Signs:   Vitals:    01/09/25 0916   BP: 110/80   Pulse: 55   SpO2: 95%   Weight: 89.4 kg (197 lb 3.2 oz)   Height: 172.7 cm (67.99\")     Body mass index is 29.99 kg/m².     Physical Exam  Nursing note reviewed  Const: NAD, A&Ox4, Pleasant, Cooperative  Eyes: EOMI, no conjunctivitis  ENT: No nasal discharge present, neck supple  Cardiac: Regular rate and rhythm, no cyanosis  Resp: Respiratory rate within normal limits, no increased work of breathing, no audible wheezing or retractions noted  GI: No distention or ascites  MSK: Motor and sensation grossly intact in " bilateral upper extremities  Neurologic: CN II-XII grossly intact  Psych: Appropriate mood and behavior.  Skin: Warm, dry  Procedures/Radiology     Procedures  XR chest AP portable    Result Date: 1/1/2025  No acute process. Images reviewed, interpreted, and dictated by Dr. Priya Mcpherson. Transcribed by Zainab Reed      Assessment & Plan   Assessment / Plan      Assessment/Plan:   Problems Addressed This Visit  Diagnoses and all orders for this visit:    1. Acute respiratory failure with hypoxia (Primary)  -     CBC & Differential; Future    2. Stage 2 moderate COPD by GOLD classification  Assessment & Plan:  Ref to pulm  Advair not covered, change to Symbicort  Continue albuterol PRN    Orders:  -     Ambulatory Referral to Pulmonology  -     budesonide-formoterol (Symbicort) 160-4.5 MCG/ACT inhaler; Inhale 2 puffs 2 (Two) Times a Day.  Dispense: 10.2 g; Refill: 12    3. Hyponatremia  Comments:  132 inpatient. Recheck today  Orders:  -     Comprehensive Metabolic Panel; Future    4. Pneumonia due to infectious organism, unspecified laterality, unspecified part of lung  -     CBC & Differential; Future    5. Screening for hyperlipidemia  -     Lipid Panel; Future    6. Screening for blood or protein in urine  -     Microalbumin / Creatinine Urine Ratio - Urine, Clean Catch; Future  -     Urinalysis With Microscopic If Indicated (No Culture) - Urine, Clean Catch; Future    7. Screening for endocrine disorder  -     Hemoglobin A1c; Future  -     Comprehensive Metabolic Panel; Future  -     TSH Rfx On Abnormal To Free T4; Future    8. Screening for deficiency anemia  -     CBC & Differential; Future    9. Screening PSA (prostate specific antigen)  -     PSA Screen; Future    10. Preventative health care  -     Lipid Panel; Future  -     Microalbumin / Creatinine Urine Ratio - Urine, Clean Catch; Future  -     Hemoglobin A1c; Future  -     Comprehensive Metabolic Panel; Future  -     CBC & Differential; Future  -      Urinalysis With Microscopic If Indicated (No Culture) - Urine, Clean Catch; Future  -     TSH Rfx On Abnormal To Free T4; Future  -     PSA Screen; Future    11. Hyperglycemia    12. Personal history of tobacco use, presenting hazards to health    13. Chronic systolic heart failure  Assessment & Plan:  Continue f/u with cardiology at end of month        Problem List Items Addressed This Visit          Cardiac and Vasculature    Chronic systolic heart failure    Overview     Echo 3/2/20  Left ventricular systolic function is moderately decreased. Estimated EF appears to be in the range of 36 - 40%.  Left ventricular wall thickness is consistent with mild concentric hypertrophy.  The left ventricular cavity is mildly dilated.  There is left ventricular global hypokinesis noted.  Left ventricular diastolic dysfunction (grade I a) consistent with impaired relaxation.  Moderate mitral valve regurgitation is present         Current Assessment & Plan     Continue f/u with cardiology at end of month            Endocrine and Metabolic    Hyperglycemia       Pulmonary and Pneumonias    Stage 2 moderate COPD by GOLD classification    Current Assessment & Plan     Ref to pulm  Advair not covered, change to Symbicort  Continue albuterol PRN         Relevant Medications    albuterol sulfate  (90 Base) MCG/ACT inhaler    budesonide-formoterol (Symbicort) 160-4.5 MCG/ACT inhaler    Other Relevant Orders    Ambulatory Referral to Pulmonology       Tobacco    Personal history of tobacco use, presenting hazards to health     Other Visit Diagnoses       Acute respiratory failure with hypoxia    -  Primary    Relevant Orders    CBC & Differential    Hyponatremia        132 inpatient. Recheck today    Relevant Orders    Comprehensive Metabolic Panel    Pneumonia due to infectious organism, unspecified laterality, unspecified part of lung        Relevant Medications    albuterol sulfate  (90 Base) MCG/ACT inhaler     amoxicillin-clavulanate (AUGMENTIN) 875-125 MG per tablet    doxycycline (VIBRAMYCIN) 100 MG capsule    budesonide-formoterol (Symbicort) 160-4.5 MCG/ACT inhaler    Other Relevant Orders    CBC & Differential    Screening for hyperlipidemia        Relevant Orders    Lipid Panel    Screening for blood or protein in urine        Relevant Orders    Microalbumin / Creatinine Urine Ratio - Urine, Clean Catch    Urinalysis With Microscopic If Indicated (No Culture) - Urine, Clean Catch    Screening for endocrine disorder        Relevant Orders    Hemoglobin A1c    Comprehensive Metabolic Panel    TSH Rfx On Abnormal To Free T4    Screening for deficiency anemia        Relevant Orders    CBC & Differential    Screening PSA (prostate specific antigen)        Relevant Orders    PSA Screen    Preventative health care        Relevant Orders    Lipid Panel    Microalbumin / Creatinine Urine Ratio - Urine, Clean Catch    Hemoglobin A1c    Comprehensive Metabolic Panel    CBC & Differential    Urinalysis With Microscopic If Indicated (No Culture) - Urine, Clean Catch    TSH Rfx On Abnormal To Free T4    PSA Screen            See patient diagnoses and orders along with patient instructions for assessment, plan, and changes to care for patient.    Patient Instructions   I would like you to have your labs done about a week prior to your next appointment.  You do not need an appointment, but I would advise to call our office a few days before you plan to have your labs done to confirm that orders are in and active.  We will discuss the results at your next scheduled visit.  -Lab services are available at any [facility] outpatient lab center, including across the street at [address] [place] [address] Dr     Follow Up:   Return in about 3 months (around 4/9/2025) for Medicare Wellness, (fasting blood work 1 week prior to appt).    MDM       [facility] [address]  [facility]  [address]  [facility]  [phone]  Fax [phone]  Phone [phone]

## 2025-01-10 NOTE — TELEPHONE ENCOUNTER
Called and let patient know that I was called with a critical lab for a K of 6.2. His potassium was previously 4.8 and Cr 1.01 on outside labs with GFR > 60. Today, his Cr is 1.43 with K 6.2 with GFR of 52. Advised patient that with a K over 6 he needed to go to ED so they could get EKG and make sure no EKG changes as well as repeat number to make sure not still going up. They could also help get the K down. Patient did disclose that he took 1 pill yesterday and day before of an old potassium script that he had. However the bottle he read to me said that the Kcl was 10meq which should only have raised the K by 0.2 in total. He has also been eating a lot of bananas. I did advise that he should not take the non-prescribed K and he should lay off the bananas in the longterm, but in the short term we just needed to make sure no EKG changes etc. He was willing to go to the ED. They live in [place] and were planning to go to the [facility] ED in [place]. Also spoke with his wife.

## 2025-01-23 ENCOUNTER — HOSPITAL ENCOUNTER (OUTPATIENT)
Dept: CARDIOLOGY | Facility: HOSPITAL | Age: 70
Discharge: HOME | End: 2025-01-23
Admitting: HOSPITALIST
Payer: MEDICARE

## 2025-01-23 VITALS — WEIGHT: 197.09 LBS | BODY MASS INDEX: 29.87 KG/M2 | HEIGHT: 68 IN

## 2025-01-23 DIAGNOSIS — I50.22 CHRONIC SYSTOLIC HEART FAILURE: ICD-10-CM

## 2025-01-23 DIAGNOSIS — I34.0 NON-RHEUMATIC MITRAL REGURGITATION: ICD-10-CM

## 2025-01-23 PROCEDURE — 93306 TTE W/DOPPLER COMPLETE: CPT

## 2025-01-24 LAB
ASCENDING AORTA: 3.6 CM
AV MEAN PRESS GRAD SYS DOP V1V2: 3 MMHG
AV VMAX SYS DOP: 120 CM/SEC
BH CV ECHO MEAS - AO MAX PG: 5.8 MMHG
BH CV ECHO MEAS - AO ROOT DIAM: 3.2 CM
BH CV ECHO MEAS - AO V2 VTI: 18.9 CM
BH CV ECHO MEAS - AVA(I,D): 2.9 CM2
BH CV ECHO MEAS - EDV(CUBED): 46.7 ML
BH CV ECHO MEAS - EDV(MOD-SP2): 47.8 ML
BH CV ECHO MEAS - EDV(MOD-SP4): 72.7 ML
BH CV ECHO MEAS - EF(MOD-SP2): 52.7 %
BH CV ECHO MEAS - EF(MOD-SP4): 52.5 %
BH CV ECHO MEAS - ESV(CUBED): 19.7 ML
BH CV ECHO MEAS - ESV(MOD-SP2): 22.6 ML
BH CV ECHO MEAS - ESV(MOD-SP4): 34.5 ML
BH CV ECHO MEAS - FS: 25 %
BH CV ECHO MEAS - IVS/LVPW: 1 CM
BH CV ECHO MEAS - IVSD: 1.3 CM
BH CV ECHO MEAS - LA DIMENSION: 2.9 CM
BH CV ECHO MEAS - LAT PEAK E' VEL: 7.1 CM/SEC
BH CV ECHO MEAS - LV DIASTOLIC VOL/BSA (35-75): 36.2 CM2
BH CV ECHO MEAS - LV MASS(C)D: 160.1 GRAMS
BH CV ECHO MEAS - LV MAX PG: 4.7 MMHG
BH CV ECHO MEAS - LV MEAN PG: 3 MMHG
BH CV ECHO MEAS - LV SYSTOLIC VOL/BSA (12-30): 17.2 CM2
BH CV ECHO MEAS - LV V1 MAX: 108 CM/SEC
BH CV ECHO MEAS - LV V1 VTI: 17.4 CM
BH CV ECHO MEAS - LVIDD: 3.6 CM
BH CV ECHO MEAS - LVIDS: 2.7 CM
BH CV ECHO MEAS - LVOT AREA: 3.1 CM2
BH CV ECHO MEAS - LVOT DIAM: 2 CM
BH CV ECHO MEAS - LVPWD: 1.3 CM
BH CV ECHO MEAS - MED PEAK E' VEL: 9 CM/SEC
BH CV ECHO MEAS - MV A MAX VEL: 72.8 CM/SEC
BH CV ECHO MEAS - MV DEC SLOPE: 399 CM/SEC2
BH CV ECHO MEAS - MV DEC TIME: 0.16 SEC
BH CV ECHO MEAS - MV E MAX VEL: 43.5 CM/SEC
BH CV ECHO MEAS - MV E/A: 0.6
BH CV ECHO MEAS - MV MAX PG: 2.23 MMHG
BH CV ECHO MEAS - MV MEAN PG: 1 MMHG
BH CV ECHO MEAS - MV P1/2T: 54.2 MSEC
BH CV ECHO MEAS - MV V2 VTI: 13.7 CM
BH CV ECHO MEAS - MVA(P1/2T): 4.1 CM2
BH CV ECHO MEAS - MVA(VTI): 4 CM2
BH CV ECHO MEAS - PA ACC TIME: 0.12 SEC
BH CV ECHO MEAS - RAP SYSTOLE: 3 MMHG
BH CV ECHO MEAS - RVSP: 24 MMHG
BH CV ECHO MEAS - SV(LVOT): 54.7 ML
BH CV ECHO MEAS - SV(MOD-SP2): 25.2 ML
BH CV ECHO MEAS - SV(MOD-SP4): 38.2 ML
BH CV ECHO MEAS - SVI(LVOT): 27.2 ML/M2
BH CV ECHO MEAS - SVI(MOD-SP2): 12.5 ML/M2
BH CV ECHO MEAS - SVI(MOD-SP4): 19 ML/M2
BH CV ECHO MEAS - TAPSE (>1.6): 2.04 CM
BH CV ECHO MEAS - TR MAX PG: 20.9 MMHG
BH CV ECHO MEAS - TR MAX VEL: 228.7 CM/SEC
BH CV ECHO MEASUREMENTS AVERAGE E/E' RATIO: 5.4
BH CV XLRA - RV BASE: 3.5 CM
BH CV XLRA - RV LENGTH: 5.3 CM
BH CV XLRA - RV MID: 2.4 CM
BH CV XLRA - TDI S': 16.8 CM/SEC
LEFT ATRIUM VOLUME INDEX: 14.4 ML/M2
LV EF BIPLANE MOD: 52.2 %

## 2025-01-29 ENCOUNTER — TELEPHONE (OUTPATIENT)
Dept: CARDIOLOGY | Facility: CLINIC | Age: 70
End: 2025-01-29
Payer: MEDICARE

## 2025-01-29 NOTE — TELEPHONE ENCOUNTER
----- Message from Kimi Ledesma sent at 1/28/2025  3:26 PM EST -----  Can you let this patient know that his echo shows his heart is pumping normally.  His EF has improved to 56-60% when compared to prior echo in 2020, (EF was 36-40% in 2020).  So overall the echo looked better when compared to his echo from 2020.  Continue current plan of care.  Encourage increased exercise to improve cardiac fitness.  Thank you.

## 2025-04-09 ENCOUNTER — OFFICE VISIT (OUTPATIENT)
Dept: FAMILY MEDICINE CLINIC | Facility: CLINIC | Age: 70
End: 2025-04-09
Payer: MEDICARE

## 2025-04-09 ENCOUNTER — LAB (OUTPATIENT)
Dept: LAB | Facility: HOSPITAL | Age: 70
End: 2025-04-09
Payer: MEDICARE

## 2025-04-09 VITALS
OXYGEN SATURATION: 96 % | HEIGHT: 68 IN | DIASTOLIC BLOOD PRESSURE: 78 MMHG | WEIGHT: 211.4 LBS | SYSTOLIC BLOOD PRESSURE: 116 MMHG | HEART RATE: 78 BPM | BODY MASS INDEX: 32.04 KG/M2

## 2025-04-09 DIAGNOSIS — Z00.00 MEDICARE ANNUAL WELLNESS VISIT, SUBSEQUENT: Primary | ICD-10-CM

## 2025-04-09 DIAGNOSIS — D72.829 LEUKOCYTOSIS, UNSPECIFIED TYPE: ICD-10-CM

## 2025-04-09 DIAGNOSIS — Z87.891 PERSONAL HISTORY OF TOBACCO USE, PRESENTING HAZARDS TO HEALTH: ICD-10-CM

## 2025-04-09 DIAGNOSIS — R79.89 ELEVATED SERUM CREATININE: ICD-10-CM

## 2025-04-09 DIAGNOSIS — Z91.09 ENVIRONMENTAL ALLERGIES: ICD-10-CM

## 2025-04-09 DIAGNOSIS — J44.9 STAGE 2 MODERATE COPD BY GOLD CLASSIFICATION: ICD-10-CM

## 2025-04-09 LAB
BASOPHILS # BLD AUTO: 0.05 10*3/MM3 (ref 0–0.2)
BASOPHILS NFR BLD AUTO: 0.5 % (ref 0–1.5)
DEPRECATED RDW RBC AUTO: 46.4 FL (ref 37–54)
EOSINOPHIL # BLD AUTO: 0.04 10*3/MM3 (ref 0–0.4)
EOSINOPHIL NFR BLD AUTO: 0.4 % (ref 0.3–6.2)
ERYTHROCYTE [DISTWIDTH] IN BLOOD BY AUTOMATED COUNT: 13.3 % (ref 12.3–15.4)
HCT VFR BLD AUTO: 50.3 % (ref 37.5–51)
HGB BLD-MCNC: 16.2 G/DL (ref 13–17.7)
IMM GRANULOCYTES # BLD AUTO: 0.09 10*3/MM3 (ref 0–0.05)
IMM GRANULOCYTES NFR BLD AUTO: 0.9 % (ref 0–0.5)
LYMPHOCYTES # BLD AUTO: 0.93 10*3/MM3 (ref 0.7–3.1)
LYMPHOCYTES NFR BLD AUTO: 8.9 % (ref 19.6–45.3)
MCH RBC QN AUTO: 30.6 PG (ref 26.6–33)
MCHC RBC AUTO-ENTMCNC: 32.2 G/DL (ref 31.5–35.7)
MCV RBC AUTO: 94.9 FL (ref 79–97)
MONOCYTES # BLD AUTO: 0.25 10*3/MM3 (ref 0.1–0.9)
MONOCYTES NFR BLD AUTO: 2.4 % (ref 5–12)
NEUTROPHILS NFR BLD AUTO: 86.9 % (ref 42.7–76)
NEUTROPHILS NFR BLD AUTO: 9.09 10*3/MM3 (ref 1.7–7)
NRBC BLD AUTO-RTO: 0 /100 WBC (ref 0–0.2)
PLATELET # BLD AUTO: 235 10*3/MM3 (ref 140–450)
PMV BLD AUTO: 10.9 FL (ref 6–12)
RBC # BLD AUTO: 5.3 10*6/MM3 (ref 4.14–5.8)
WBC NRBC COR # BLD AUTO: 10.45 10*3/MM3 (ref 3.4–10.8)

## 2025-04-09 PROCEDURE — 80048 BASIC METABOLIC PNL TOTAL CA: CPT

## 2025-04-09 PROCEDURE — 85025 COMPLETE CBC W/AUTO DIFF WBC: CPT

## 2025-04-09 RX ORDER — BUDESONIDE AND FORMOTEROL FUMARATE DIHYDRATE 160; 4.5 UG/1; UG/1
2 AEROSOL RESPIRATORY (INHALATION)
Qty: 10.2 G | Refills: 12 | Status: SHIPPED | OUTPATIENT
Start: 2025-04-09

## 2025-04-09 RX ORDER — FEXOFENADINE HCL 180 MG/1
180 TABLET ORAL DAILY
Qty: 90 TABLET | Refills: 3 | Status: SHIPPED | OUTPATIENT
Start: 2025-04-09

## 2025-04-09 RX ORDER — NICOTINE 4 MG/1
1 INHALANT RESPIRATORY (INHALATION)
Qty: 168 EACH | Refills: 11 | Status: SHIPPED | OUTPATIENT
Start: 2025-04-09

## 2025-04-09 NOTE — PROGRESS NOTES
Subjective   The ABCs of the Annual Wellness Visit  Medicare Wellness Visit      Kb Walker is a 69 y.o. patient who presents for a Medicare Wellness Visit.    The following portions of the patient's history were reviewed and   updated as appropriate: allergies, current medications, past family history, past medical history, past social history, past surgical history, and problem list.    Compared to one year ago, the patient's physical   health is the same.  Compared to one year ago, the patient's mental   health is the same.    Recent Hospitalizations:  He was not admitted to the hospital during the last year.     Current Medical Providers:  Patient Care Team:  Lonny Pugh DO as PCP - General (Family Medicine)  Laurent Flores MD as Consulting Physician (Cardiology)  Kimi Ledesma APRN as Nurse Practitioner (Cardiology)    Outpatient Medications Prior to Visit   Medication Sig Dispense Refill    acetaminophen (TYLENOL) 325 MG tablet Take 2 tablets by mouth Every 6 (Six) Hours As Needed for Mild Pain.      carvedilol (COREG) 12.5 MG tablet TAKE 1 TABLET BY MOUTH TWICE A  tablet 0    diphenhydrAMINE (BENADRYL) 50 MG tablet Take 1 tablet by mouth At Night As Needed.      lisinopril (PRINIVIL,ZESTRIL) 10 MG tablet TAKE 1 TABLET BY MOUTH EVERY DAY 90 tablet 0    melatonin 3 MG tablet Take 1 tablet by mouth Daily.      Multi-Vitamin tablet tablet Take 1 tablet by mouth Daily.      amoxicillin-clavulanate (AUGMENTIN) 875-125 MG per tablet TAKE 1 TABLET BY MOUTH 2 (TWO) TIMES DAILY WITH BREAKFAST AND DINNER FOR 5 DAYS. (Patient not taking: Reported on 4/9/2025)      budesonide-formoterol (Symbicort) 160-4.5 MCG/ACT inhaler Inhale 2 puffs 2 (Two) Times a Day. (Patient not taking: Reported on 4/9/2025) 10.2 g 12    diphenoxylate-atropine (LOMOTIL) 2.5-0.025 MG per tablet Take 1 tablet by mouth As Needed. (Patient not taking: Reported on 4/9/2025)      doxycycline (VIBRAMYCIN) 100 MG capsule TAKE 1  "CAPSULE (100 MG TOTAL) BY MOUTH EVERY 12 (TWELVE) HOURS FOR 5 DAYS. (Patient not taking: Reported on 4/9/2025)       No facility-administered medications prior to visit.     No opioid medication identified on active medication list. I have reviewed chart for other potential  high risk medication/s and harmful drug interactions in the elderly.      Aspirin is not on active medication list.  Aspirin use is not indicated based on review of current medical condition/s. Risk of harm outweighs potential benefits.  .    Patient Active Problem List   Diagnosis    Personal history of tobacco use, presenting hazards to health    Hyperglycemia    Chronic systolic heart failure    Non-rheumatic mitral regurgitation    Stage 2 moderate COPD by GOLD classification     Advance Care Planning Advance Directive is not on file.  ACP discussion was held with the patient during this visit. Patient does not have an advance directive, information provided.            Objective   Vitals:    04/09/25 1107   BP: 116/78   Pulse: 78   SpO2: 96%   Weight: 95.9 kg (211 lb 6.4 oz)   Height: 172.7 cm (67.99\")   PainSc: 0-No pain       Estimated body mass index is 32.15 kg/m² as calculated from the following:    Height as of this encounter: 172.7 cm (67.99\").    Weight as of this encounter: 95.9 kg (211 lb 6.4 oz).    BMI is >= 30 and <35. (Class 1 Obesity). The following options were offered after discussion;: weight loss educational material (shared in after visit summary)           Does the patient have evidence of cognitive impairment? No            ATTENTION  What is the year: correct  What is the month of the year: correct  What is the day of the week?: correct  What is the date?: correct  MEMORY  Repeat address three times, only score third attempt: Jono English [address] [place] [address]: 7  HOW MANY ANIMALS DID THE PATIENT NAME  Verbal Fluency -- Animal Names (0-25): 22+  CLOCK DRAWING  Clock Drawing: All Correct  MEMORY " RECALL  Tell me what you remember about that name and address we were repeating at the beginnin  ACE TOTAL SCORE  Total ACE Score - <25/30 strongly suggests cognitive impairment; <21/30 almost certainly shows dementia: 28                                                                                      Health  Risk Assessment    Smoking Status:  Social History     Tobacco Use   Smoking Status Every Day    Current packs/day: 0.50    Average packs/day: 0.5 packs/day for 50.0 years (25.0 ttl pk-yrs)    Types: Cigarettes   Smokeless Tobacco Never   Tobacco Comments    placed the patch 2019     Alcohol Consumption:  Social History     Substance and Sexual Activity   Alcohol Use Not Currently    Comment: occasional       Fall Risk Screen  STEADI Fall Risk Assessment was completed, and patient is at LOW risk for falls.Assessment completed on:2025    Depression Screening   Little interest or pleasure in doing things? Not at all   Feeling down, depressed, or hopeless? Not at all   PHQ-2 Total Score 0      Health Habits and Functional and Cognitive Screenin/9/2025    11:05 AM   Functional & Cognitive Status   Do you have difficulty preparing food and eating? No   Do you have difficulty bathing yourself, getting dressed or grooming yourself? No   Do you have difficulty using the toilet? No   Do you have difficulty moving around from place to place? No   Do you have trouble with steps or getting out of a bed or a chair? No   Current Diet Well Balanced Diet   Dental Exam Up to date   Eye Exam Up to date   Exercise (times per week) 7 times per week   Current Exercises Include Walking        Exercise Comment farming   Do you need help using the phone?  No   Are you deaf or do you have serious difficulty hearing?  Yes   Do you need help to go to places out of walking distance? No   Do you need help shopping? No   Do you need help preparing meals?  No   Do you need help with housework?  No   Do you need  help with laundry? No   Do you need help taking your medications? No   Do you need help managing money? No   Do you ever drive or ride in a car without wearing a seat belt? No   Have you felt unusual stress, anger or loneliness in the last month? No   Who do you live with? Spouse   If you need help, do you have trouble finding someone available to you? No   Have you been bothered in the last four weeks by sexual problems? No   Do you have difficulty concentrating, remembering or making decisions? No           Age-appropriate Screening Schedule:  Refer to the list below for future screening recommendations based on patient's age, sex and/or medical conditions. Orders for these recommended tests are listed in the plan section. The patient has been provided with a written plan.    Health Maintenance List  Health Maintenance   Topic Date Due    Pneumococcal Vaccine 50+ (1 of 2 - PCV) Never done    COLORECTAL CANCER SCREENING  Never done    ZOSTER VACCINE (1 of 2) Never done    LUNG CANCER SCREENING  Never done    ANNUAL WELLNESS VISIT  05/04/2023    COVID-19 Vaccine (1 - 2024-25 season) Never done    INFLUENZA VACCINE  07/01/2025    TDAP/TD VACCINES (2 - Td or Tdap) 12/15/2033    HEPATITIS C SCREENING  Completed    AAA SCREEN ONCE  Completed                                                                                                                                                CMS Preventative Services Quick Reference  Risk Factors Identified During Encounter  Tobacco Use/Dependance Risk (use dotphrase .tobaccocessation for documentation)    The above risks/problems have been discussed with the patient.  Pertinent information has been shared with the patient in the After Visit Summary.  An After Visit Summary and PPPS were made available to the patient.    Follow Up:   Next Medicare Wellness visit to be scheduled in 1 year.         Additional E&M Note during same encounter follows:  Patient has additional,  "significant, and separately identifiable condition(s)/problem(s) that require work above and beyond the Medicare Wellness Visit     Chief Complaint  Medicare Wellness-subsequent    Subjective   HPI  Melchor is also being seen today for an annual adult preventative physical exam.   He was hospitalized in January, I last saw him for hospital follow-up afterward.  At that time he had been unable to  his Advair inhaler and was not using the albuterol, I placed a referral to pulmonology at that time.  It looks like he has canceled a couple of appointments he had scheduled with them over the last 2 months and has not yet rescheduled.  He did see his cardiologist for an echocardiogram in January, this came back looking pretty good and improved compared to 2020.  They recommended just to increase his exercise overall to improve his cardiac fitness.              Objective   Vital Signs:  /78   Pulse 78   Ht 172.7 cm (67.99\")   Wt 95.9 kg (211 lb 6.4 oz)   SpO2 96%   BMI 32.15 kg/m²   Physical Exam  Const: NAD, A&Ox4, Pleasant, Cooperative  Eyes: EOMI, no conjunctivitis  ENT: No nasal discharge present, neck supple  Cardiac: Regular rate and rhythm, no cyanosis  Resp: Respiratory rate within normal limits, no increased work of breathing, no audible wheezing or retractions noted  GI: No distention or ascites  MSK: Motor and sensation grossly intact in bilateral upper extremities  Neurologic: CN II-XII grossly intact  Psych: Appropriate mood and behavior.       Assessment and Plan         Medicare annual wellness visit, subsequent    Environmental allergies    Elevated serum creatinine    Leukocytosis, unspecified type    Stage 2 moderate COPD by GOLD classification    Personal history of tobacco use, presenting hazards to health  Ongoing tobacco use, previous attempts to quit. Declines assistance today.  Diagnoses and all orders for this visit:    1. Medicare annual wellness visit, subsequent (Primary)    2. " Environmental allergies  -     fexofenadine (Allegra Allergy) 180 MG tablet; Take 1 tablet by mouth Daily.  Dispense: 90 tablet; Refill: 3    3. Elevated serum creatinine  -     Basic Metabolic Panel; Future    4. Leukocytosis, unspecified type  -     CBC & Differential; Future    5. Stage 2 moderate COPD by GOLD classification  -     budesonide-formoterol (Symbicort) 160-4.5 MCG/ACT inhaler; Inhale 2 puffs 2 (Two) Times a Day.  Dispense: 10.2 g; Refill: 12    6. Personal history of tobacco use, presenting hazards to health  Assessment & Plan:  Ongoing tobacco use, previous attempts to quit. Declines assistance today.    Orders:  -      CT Chest Low Dose Cancer Screening WO; Future  -     nicotine (Nicotrol) 10 MG inhaler; Inhale 1 puff 5 (Five) Times a Day As Needed for Smoking Cessation. Each dose equals about 2 cigarettes  Dispense: 168 each; Refill: 11    Other orders  -     COGNITIVE ASSESSMENT SCAN       Problem List Items Addressed This Visit          Pulmonary and Pneumonias    Stage 2 moderate COPD by GOLD classification    Relevant Medications    fexofenadine (Allegra Allergy) 180 MG tablet    budesonide-formoterol (Symbicort) 160-4.5 MCG/ACT inhaler       Tobacco    Personal history of tobacco use, presenting hazards to health    Current Assessment & Plan   Ongoing tobacco use, previous attempts to quit. Declines assistance today.         Relevant Medications    nicotine (Nicotrol) 10 MG inhaler    Other Relevant Orders     CT Chest Low Dose Cancer Screening WO     Other Visit Diagnoses         Medicare annual wellness visit, subsequent    -  Primary      Environmental allergies        Relevant Medications    fexofenadine (Allegra Allergy) 180 MG tablet      Elevated serum creatinine        Relevant Orders    Basic Metabolic Panel (Completed)      Leukocytosis, unspecified type        Relevant Orders    CBC & Differential (Completed)            The wellness exam has been reviewed in detail.  The patient  has been fully counseled on preventative guidelines for vaccines, cancer screenings, and other health maintenance needs.  Functional testing has been performed to assess capacity for independent living and need for other medical interventions.   The patient was counseled on maintaining a lifestyle to promote good health and to minimize chronic diseases.  The patient has been assisted with scheduling healthcare procedures for the coming year and given a written document outlining these recommendations.    Return in about 6 months (around 10/9/2025) for f/u blood pressure, allergies, smoking.    New Medications Ordered This Visit   Medications    fexofenadine (Allegra Allergy) 180 MG tablet     Sig: Take 1 tablet by mouth Daily.     Dispense:  90 tablet     Refill:  3    budesonide-formoterol (Symbicort) 160-4.5 MCG/ACT inhaler     Sig: Inhale 2 puffs 2 (Two) Times a Day.     Dispense:  10.2 g     Refill:  12    nicotine (Nicotrol) 10 MG inhaler     Sig: Inhale 1 puff 5 (Five) Times a Day As Needed for Smoking Cessation. Each dose equals about 2 cigarettes     Dispense:  168 each     Refill:  11          Follow Up   Return in about 6 months (around 10/9/2025) for f/u blood pressure, allergies, smoking.  Patient was given instructions and counseling regarding his condition or for health maintenance advice. Please see specific information pulled into the AVS if appropriate.

## 2025-04-09 NOTE — PATIENT INSTRUCTIONS
Advance Care Planning and Advance Directives     You make decisions on a daily basis - decisions about where you want to live, your career, your home, your life. Perhaps one of the most important decisions you face is your choice for future medical care. Take time to talk with your family and your healthcare team and start planning today.  Advance Care Planning is a process that can help you:  Understand possible future healthcare decisions in light of your own experiences  Reflect on those decision in light of your goals and values  Discuss your decisions with those closest to you and the healthcare professionals that care for you  Make a plan by creating a document that reflects your wishes    Surrogate Decision Maker  In the event of a medical emergency, which has left you unable to communicate or to make your own decisions, you would need someone to make decisions for you.  It is important to discuss your preferences for medical treatment with this person while you are in good health.     Qualities of a surrogate decision maker:  Willing to take on this role and responsibility  Knows what you want for future medical care  Willing to follow your wishes even if they don't agree with them  Able to make difficult medical decisions under stressful circumstances    Advance Directives  These are legal documents you can create that will guide your healthcare team and decision maker(s) when needed. These documents can be stored in the electronic medical record.    Living Will - a legal document to guide your care if you have a terminal condition or a serious illness and are unable to communicate. States vary by statute in document names/types, but most forms may include one or more of the following:        -  Directions regarding life-prolonging treatments        -  Directions regarding artificially provided nutrition/hydration        -  Choosing a healthcare decision maker        -  Direction regarding organ/tissue  donation    Durable Power of  for Healthcare - this document names an -in-fact to make medical decisions for you, but it may also allow this person to make personal and financial decisions for you. Please seek the advice of an  if you need this type of document.    **Advance Directives are not required and no one may discriminate against you if you do not sign one.    Medical Orders  Many states allow specific forms/orders signed by your physician to record your wishes for medical treatment in your current state of health. This form, signed in personal communication with your physician, addresses resuscitation and other medical interventions that you may or may not want.      For more information or to schedule a time with a [facility] Advance Care Planning Facilitator contact: [facility] or call [phone] and someone will contact you directly.  You are due for Shingrix vaccination series ( the newest shingles vaccine).  It is a two shot series spaced 2-6 months apart. Please get this vaccine series started at your earliest convenience at your local pharmacy to help avoid shingles outbreak. It is more effective than the old Zostavax vaccine and is recommended even if you have had the Zostavax vaccine in the past.  Once the Shingrix series is completed, it does not need to be repeated.   For more information, please look at the website below:  https://www.cdc.gov/vaccines/vpd/shingles/public/shingrix/index.html    You are due for a Covid 19 vaccination. (provides protection against Covid 19 Viral Infection) Please  talk to your pharmacist and get the immunization at your local pharmacy at your earliest convenience. Please click on the link for more information about this vaccine.   https://www.cdc.gov/coronavirus/2019-ncov/vaccines/stay-up-to-date.html        Medicare Wellness  Personal Prevention Plan of Service     Date of Office Visit:    Encounter Provider:  Lonny Tillman  DO [place]  Place of Service:  [facility]  Patient Name: Kb Walker  :  1955    As part of the Medicare Wellness portion of your visit today, we are providing you with this personalized preventive plan of services (PPPS). This plan is based upon recommendations of the [place] Preventive Services Task Force (USPSTF) and the Advisory Committee on Immunization Practices (ACIP).    This lists the preventive care services that should be considered, and provides dates of when you are due. Items listed as completed are up-to-date and do not require any further intervention.    Health Maintenance   Topic Date Due   • Pneumococcal Vaccine 50+ (1 of 2 - PCV) Never done   • COLORECTAL CANCER SCREENING  Never done   • ZOSTER VACCINE (1 of 2) Never done   • LUNG CANCER SCREENING  Never done   • ANNUAL WELLNESS VISIT  2023   • COVID-19 Vaccine ( season) Never done   • INFLUENZA VACCINE  2025   • TDAP/TD VACCINES (2 - Td or Tdap) 12/15/2033   • HEPATITIS C SCREENING  Completed   • AAA SCREEN ONCE  Completed       Orders Placed This Encounter   Procedures   • Basic Metabolic Panel     Standing Status:   Future     Expected Date:   2025     Expiration Date:   2026     Release to patient:   Routine Release [[id number]]   • CBC & Differential     Standing Status:   Future     Expected Date:   2025     Expiration Date:   2026     Manual Differential:   No     Release to patient:   Routine Release [[id number]]       No follow-ups on file.

## 2025-04-10 ENCOUNTER — TELEPHONE (OUTPATIENT)
Dept: FAMILY MEDICINE CLINIC | Facility: CLINIC | Age: 70
End: 2025-04-10
Payer: MEDICARE

## 2025-04-10 LAB
ANION GAP SERPL CALCULATED.3IONS-SCNC: 10.1 MMOL/L (ref 5–15)
BUN SERPL-MCNC: 20 MG/DL (ref 8–23)
BUN/CREAT SERPL: 15.6 (ref 7–25)
CALCIUM SPEC-SCNC: 9.2 MG/DL (ref 8.6–10.5)
CHLORIDE SERPL-SCNC: 98 MMOL/L (ref 98–107)
CO2 SERPL-SCNC: 26.9 MMOL/L (ref 22–29)
CREAT SERPL-MCNC: 1.28 MG/DL (ref 0.76–1.27)
EGFRCR SERPLBLD CKD-EPI 2021: 59.8 ML/MIN/1.73
GLUCOSE SERPL-MCNC: 127 MG/DL (ref 65–99)
POTASSIUM SERPL-SCNC: 6.4 MMOL/L (ref 3.5–5.2)
SODIUM SERPL-SCNC: 135 MMOL/L (ref 136–145)

## 2025-04-10 NOTE — TELEPHONE ENCOUNTER
Received after hours call with abnormal potassium reading on 6.1, GFR 59. Called patient twice to discuss results (3:30AM and 6:45AM), but no answer, left voicemail. Please advise patient as potassium is over 6.4, would recommend he report to ER for repeat labs and EKG.

## 2025-04-16 DIAGNOSIS — Z87.891 PERSONAL HISTORY OF TOBACCO USE, PRESENTING HAZARDS TO HEALTH: ICD-10-CM

## 2025-04-16 NOTE — TELEPHONE ENCOUNTER
Pharmacy requested   Nicotrol Cartridge inhaler.    Alternative Requested    Pharmacy Comments:  Alternative Requested: Non formulary $600

## 2025-05-14 RX ORDER — NICOTINE 4 MG/1
1 INHALANT RESPIRATORY (INHALATION)
Qty: 168 EACH | Refills: 11 | OUTPATIENT
Start: 2025-05-14

## 2025-05-19 RX ORDER — CARVEDILOL 12.5 MG/1
12.5 TABLET ORAL EVERY 12 HOURS SCHEDULED
Qty: 180 TABLET | Refills: 0 | OUTPATIENT
Start: 2025-05-19

## 2025-05-19 RX ORDER — LISINOPRIL 10 MG/1
10 TABLET ORAL DAILY
Qty: 90 TABLET | Refills: 0 | OUTPATIENT
Start: 2025-05-19

## 2025-05-28 ENCOUNTER — TELEPHONE (OUTPATIENT)
Dept: CARDIOLOGY | Facility: CLINIC | Age: 70
End: 2025-05-28
Payer: MEDICARE

## 2025-05-28 DIAGNOSIS — I50.22 CHRONIC SYSTOLIC HEART FAILURE: Primary | ICD-10-CM

## 2025-05-28 NOTE — TELEPHONE ENCOUNTER
Pt requesting lisinopril 10 mg daily refill. Pt potassium elevated (6.4) in in April 2025. Pt advised to go to the ER and did not go. There has been no recheck since then. Please advise further steps.    Detail Level: Zone

## 2025-05-29 RX ORDER — HYDROCHLOROTHIAZIDE 12.5 MG/1
12.5 CAPSULE ORAL DAILY
Qty: 30 CAPSULE | Refills: 11 | Status: SHIPPED | OUTPATIENT
Start: 2025-05-29

## 2025-05-29 NOTE — TELEPHONE ENCOUNTER
Pt notified of recommendations. Pt agreeable to stopping lisinopril. Pt will get labs 5 days after starting HCTZ. Pt has no further questions at this time.